# Patient Record
Sex: MALE | Race: WHITE | Employment: FULL TIME | URBAN - METROPOLITAN AREA
[De-identification: names, ages, dates, MRNs, and addresses within clinical notes are randomized per-mention and may not be internally consistent; named-entity substitution may affect disease eponyms.]

---

## 2020-10-31 ENCOUNTER — HOSPITAL ENCOUNTER (EMERGENCY)
Age: 30
Discharge: PSYCHIATRIC OR SUBSTANCE USE FACILITY | End: 2020-11-01
Attending: EMERGENCY MEDICINE
Payer: MEDICAID

## 2020-10-31 VITALS
TEMPERATURE: 98.1 F | SYSTOLIC BLOOD PRESSURE: 127 MMHG | OXYGEN SATURATION: 95 % | RESPIRATION RATE: 18 BRPM | DIASTOLIC BLOOD PRESSURE: 85 MMHG | HEART RATE: 85 BPM

## 2020-10-31 PROBLEM — F32.A DEPRESSION WITH SUICIDAL IDEATION: Status: ACTIVE | Noted: 2020-10-31

## 2020-10-31 PROBLEM — R45.851 DEPRESSION WITH SUICIDAL IDEATION: Status: ACTIVE | Noted: 2020-10-31

## 2020-10-31 LAB
SARS-COV-2, RAPID: NOT DETECTED
SARS-COV-2: NORMAL
SARS-COV-2: NORMAL
SOURCE: NORMAL

## 2020-10-31 PROCEDURE — 6360000002 HC RX W HCPCS: Performed by: STUDENT IN AN ORGANIZED HEALTH CARE EDUCATION/TRAINING PROGRAM

## 2020-10-31 PROCEDURE — U0002 COVID-19 LAB TEST NON-CDC: HCPCS

## 2020-10-31 PROCEDURE — 6370000000 HC RX 637 (ALT 250 FOR IP): Performed by: STUDENT IN AN ORGANIZED HEALTH CARE EDUCATION/TRAINING PROGRAM

## 2020-10-31 PROCEDURE — 90715 TDAP VACCINE 7 YRS/> IM: CPT | Performed by: STUDENT IN AN ORGANIZED HEALTH CARE EDUCATION/TRAINING PROGRAM

## 2020-10-31 PROCEDURE — 93005 ELECTROCARDIOGRAM TRACING: CPT | Performed by: STUDENT IN AN ORGANIZED HEALTH CARE EDUCATION/TRAINING PROGRAM

## 2020-10-31 PROCEDURE — 99285 EMERGENCY DEPT VISIT HI MDM: CPT

## 2020-10-31 PROCEDURE — 90471 IMMUNIZATION ADMIN: CPT | Performed by: STUDENT IN AN ORGANIZED HEALTH CARE EDUCATION/TRAINING PROGRAM

## 2020-10-31 RX ORDER — LORAZEPAM 0.5 MG/1
0.5 TABLET ORAL ONCE
Status: COMPLETED | OUTPATIENT
Start: 2020-10-31 | End: 2020-10-31

## 2020-10-31 RX ADMIN — TETANUS TOXOID, REDUCED DIPHTHERIA TOXOID AND ACELLULAR PERTUSSIS VACCINE, ADSORBED 0.5 ML: 5; 2.5; 8; 8; 2.5 SUSPENSION INTRAMUSCULAR at 19:51

## 2020-10-31 RX ADMIN — LORAZEPAM 0.5 MG: 0.5 TABLET ORAL at 19:15

## 2020-10-31 NOTE — ED NOTES
Pt to ER c/o suicidal ideation and self harm PTA, using glass to cut arms superficially. Pt arrives anxious, shaky, tearful. Pt states he was cheated on by his sig other and she took the kids away from him. PT arrives aox4 w/ rr loyd ramond unlabored, denies medical complaints. Pt has hx of self harm and psych dx but hasnt been on meds for years. Awaiting orders. Security @ bedside. Will continue to monitor.      Jayson Hazel RN  10/31/20 [id number]

## 2020-10-31 NOTE — ED PROVIDER NOTES
[address]  Emergency Department Encounter  Emergency Medicine Resident         This patient was evaluated in the Emergency Department for symptoms described in the history of present illness. He/she was evaluated in the context of the global COVID-19 pandemic, which necessitated consideration that the patient might be at risk for infection with the SARS-CoV-2 virus that causes COVID-19. Institutional protocols and algorithms that pertain to the evaluation of patients at risk for COVID-19 are in a state of rapid change based on information released by regulatory bodies including the CDC and federal and state organizations. These policies and algorithms were followed during the patient's care in the ED. Pt Name: Willa Trinidad  MRN: [id number]  [place] 1990  Date of evaluation: 10/31/20  PCP:  [address] [place] [address]       Chief Complaint   Patient presents with    Suicidal       HISTORY OF PRESENT ILLNESS  (Location/Symptom, Timing/Onset, Context/Setting, Quality, Duration, Modifying Factors, Severity.)    Willa Trinidad is a 26 y.o. male who presents with SI. Patient has longstanding psychiatric history of bipolar disorder and prior suicide attempt. States that he found out that his wife took their children and was sleeping with another man and she is now staying at [facility] with the kids. This upset him and he was feeling very anxious so he broke off a piece of glass and tried to cut his left wrist.  Additionally he burned himself several times over the chest with cigarettes. Admits to using marijuana but denies any ingestion or other drugs. States that he has been feeling anxious for the past several days. PAST MEDICAL / SURGICAL / SOCIAL / FAMILY HISTORY    has a past medical history of Ear infection. has a past surgical history that includes Appendectomy and [place] tooth extraction.     Social History     Socioeconomic History    Marital status: Legally      Spouse name: Not on file    Number of children: Not on file    Years of education: Not on file    Highest education level: Not on file   Occupational History    Not on file   Social Needs    Financial resource strain: Not on file    Food insecurity     Worry: Not on file     Inability: Not on file    Transportation needs     Medical: Not on file     Non-medical: Not on file   Tobacco Use    Smoking status: Current Every Day Smoker     Packs/day: 1.00     Types: Cigarettes   Substance and Sexual Activity    Alcohol use: Yes     Comment: socially    Drug use: No    Sexual activity: Not on file   Lifestyle    Physical activity     Days per week: Not on file     Minutes per session: Not on file    Stress: Not on file   Relationships    Social connections     Talks on phone: Not on file     Gets together: Not on file     Attends [employer] service: Not on file     Active member of club or organization: Not on file     Attends meetings of clubs or organizations: Not on file     Relationship status: Not on file    Intimate partner violence     Fear of current or ex partner: Not on file     Emotionally abused: Not on file     Physically abused: Not on file     Forced sexual activity: Not on file   Other Topics Concern    Not on file   Social History Narrative    Not on file       Family History   Family history unknown: Yes       Allergies:    Patient has no known allergies. Home Medications:  Prior to Admission medications    Medication Sig Start Date End Date Taking? Authorizing Provider   ibuprofen (ADVIL;MOTRIN) 600 MG tablet Take 600 mg by mouth every 6 hours as needed for Pain. Historical Provider, MD   HYDROcodone-acetaminophen (NORCO) 5-325 MG per tablet Take 1 tablet by mouth every 6 hours as needed for Pain.  7/4/14   Manuela Kauffman MD       REVIEW OF SYSTEMS    (2-9 systems for level 4, 10 or more for level 5)    Negative unless specified in HPI  Gen: No Fever, No tachycardia. Reassess. Transfer to [facility] when medically stable. EMERGENCY DEPARTMENT COURSE:         DIAGNOSTIC RESULTS / EMERGENCYDEPARTMENT COURSE   LABS:  Labs Reviewed   [id number]       RADIOLOGY:  No results found. CONSULTS:  IP CONSULT TO SOCIAL WORK    CRITICAL CARE:  Please see attending note    FINAL IMPRESSION     1. Suicidal ideation    2. Suicidal behavior with attempted self-injury ([facility] [address]        DISPOSITION / PLAN   DISPOSITION      PATIENT REFERRED TO:  No follow-up provider specified. DISCHARGE MEDICATIONS:  New Prescriptions    No medications on file       Dr. Ciro Ackerman.  1970 [facility]  Emergency Medicine Resident Physician, PGY-3    (Please note that portions of this note were completed with a voice recognition program.  Efforts were made to edit the dictations but occasionally words are mis-transcribed.)         Rossi Lester DO  Resident  10/31/20 1920

## 2020-10-31 NOTE — ED NOTES
Provisional Diagnosis:    Depression with suicidal ideations. Psychosocial and Contextual Factors:     Significant other left him for someone else and took the kids yesterday. C-SSRS Summary:    Patient: X  Family:  Agency:    Present Suicidal Behavior:    Verbal: X    Attempt: X-superficial cuts to left wrist, burns to chest.    Past Suicidal Behavior:    Verbal: X    Attempt: states that he hs tried to kill himself \"everyway possible. \"     Self-Injurious/Self-Mutilation: burning      Protective Factors:    States that he has stable and secure housing. Risk Factors:    Relationship stressors, no income, no insurance. States that he has a history of bipolar disorder and is not currently on medications or in treatment. Clinical Summary:    Patient is a 26year old,  male that presents to the ED with suicidal ideations, superficial lacerations to his left wrist and reports that he has a noose hanging in his basement \"waiting for him. \"  Patient states that he has tried to kill himself multiple times, \"every way possible,\" starting at age 13. He reports that the triggering event was his significant other left him for another person and took their kids. Patient is tearful and states that he is afraid he will kill himself if he goes home. He reports a history of bipolar disorder and is not taking medications currently or involved in mental health treatment. He reports historically, medications have not help and have increased his suicidal thoughts. He reports hearing noises and voices at times and states he has seen things in the past, it is unclear if these are hallucinations and will need further assessment. Patient denies using substances other than marijuana. He reports that he does not have an income or insurance, social issues are a stressor and his support system is limited. He denies current involvement with the legal system.      Level of Care Disposition:    Writer will consult with on call provider for admission to the [facility].       SHELLY Day  10/31/20 1936

## 2020-10-31 NOTE — ED PROVIDER NOTES
[facility]     Emergency Department     Faculty Attestation    I performed a history and physical examination of the patient and discussed management with the resident. I have reviewed and agree with the residents findings including all diagnostic interpretations, and treatment plans as written at the time of my review. Any areas of disagreement are noted on the chart. I was personally present for the key portions of any procedures. I have documented in the chart those procedures where I was not present during the key portions. For Physician Assistant/ Nurse Practitioner cases/documentation I have personally evaluated this patient and have completed at least one if not all key elements of the E/M (history, physical exam, and MDM). Additional findings are as noted. This patient was evaluated in the Emergency Department for symptoms described in the history of present illness. The patient was evaluated in the context of the global COVID-19 pandemic, which necessitated consideration that the patient might be at risk for infection with the SARS-CoV-2 virus that causes COVID-19. Institutional protocols and algorithms that pertain to the evaluation of patients at risk for COVID-19 are in a state of rapid change based on information released by regulatory bodies including the CDC and federal and state organizations. These policies and algorithms were followed during the patient's care in the ED. Primary Care Physician: Gibson Lopez    History: This is a 26 y.o. male who presents to the Emergency Department with complaint of suicidal. The patient became suicidal after family issues. He attempted to cut his wrist with glass shard. Is unsure when his last tetanus immunization was given. Patient denies any alcohol or drug ingestion. He says he did smoke marijuana. Physical:   oral temperature is 98.1 °F (36.7 °C).  His blood pressure is 127/85 and his

## 2020-10-31 NOTE — ED NOTES
Writer unable to reach [facility] Access at this time, left message for follow up.      SHELLY Jansen  10/31/20 1941

## 2020-10-31 NOTE — ED NOTES
[] Merle    [] [facility]    [x]  [facility] ASSESSMENT      Y  N     [x] [] In the past two weeks have you had thoughts of hurting yourself in any way? [x] [] In the past two weeks have you had thoughts that you would be better off dead? [] [x] Have you made a suicide attempt in the past two months? [x] [] Do you have a plan for hurting yourself or suicide? [] [x] Presence of hallucinations/voices related to hurting himself or herself or someone else. SUICIDE/SECURITY WATCH PRECAUTION CHECKLIST     Orders    [x]  Suicide/Security Watch Precautions initiated as checked below:   10/31/20 7:00 PM EDT BH31/BH31B    [x] Notified physician:  Jessenia Alvarez MD  10/31/20 7:00 PM EDT    [x] Orders obtained as appropriate:     [x] 1:1 Observer     [] Psych Consult     [] Psych Consult    Name:  Date:  Time:    [x] 1:1 Observer, Notified by:  Roopa Mcbride Nurse Supervisor    [x] Remove all personal clothes from room and place in snap/paper gown/pants. Slipper only    [x] Remove all personal belongings from room and secured away from patient. Documentation    [x] Initiate Suicide/Security Watch Precaution Flow Sheet    [x] Initiate individualized Care Plan/Problem    [x] Document why precautions initiated on flow sheet (Initiate Nursing Care Plan/Problem)    [x] 1:1 Observer in place; instructions provided. Suicide precautions require observer be within arms length. [x] Nurse-Observer Communication Hand-off initiated by RN, reviewed with Observer. Subsequently used as Hand Off between Observers. [x] Initiate every 15 minute observations per observer as delegated by the RN.     [x] Initiate RN assessment and documentation    Environmental Scan  Search Criteria and Process: OPTIONAL, see Search Policy    [] Reason for search:    [] Nursing in presence of second person to search patient    [x] Patient notified of reason for body assessment and belongings search:     Persons present during search:security   Results of search and disposition:       Searchers Name: security    These items or items similar should be removed from the room:   [] Chairs   [x] Telephone   [x] Trash cans and liners   [x] Plastic utensils (order Patient Safety tray)   [x] Empty or remove Sharps containers   [x] All personal clothing/belongings removed   [x] All unnecessary lead wires, electrical cords, draw cords, etc.   [x] Flowers and plants   [x] Double check for lighters, matches, razors, any glass items etc that can be used as weapons. Person completing Checklist: Gwendolyn Clarke       GENERAL INFORMATION     Y  N     [x] [] Has the patient been informed that they are on a watch and what that means? [x] [] Can the patient get out of Bed without nursing assistance? [x] [] Can the patient use the restroom without nursing assistance? [] [x] Can the patient walk the halls to [place] their legs? \"   [] [x] Does the patient have metal utensils? [x] [] Have the patient's belongings been placed out of control of the patient? [x] [] Have the patient and his/her belongings been checked for contraband? [x] [] Is the patient under any visitor restrictions? If Yes, explain:  [email or web address]   [] [x] Is the patient under an alias? [facility] Name:   Authorized visitors (no more than two are to be on the list)   Name/Relationship:   Name/Relationship:    Name of Staff member that you  Received this information from?: writer    General Description:    Cintia Mendoza BH31/BH31B male 26 y.o.  Admission weight:      Race: [x]  [] Black  []   []   [] [place] [] Other  Facial Hair:  [x] Yes  [] No  If yes, please describe:facial scruff  Identifying Marks (i.e. Visible tattoos, scars, etc...): tattoos to arms  NURSING CARE PLAN    Nursing Diagnosis: Risk of Self Directed Harm  [x] Actual  [x] Potential  Date Started: 10/31/20      Etiological Factors: (related to)  [x] Expressed or implied suicidal ideation/behavior  [x] Depression  [] Suicide attempt      [x] Low self-esteem  [] Hallucinations      [x] Feeling of Hopelessness  [] Substance abuse or withdrawal    [x] Dysfunctional family  [x] Major traumatic event, eg., divorce, etc   [x] Excessive stress/anxiety    10/31/20    Expected Outcomes    Patient will:   [x] Patient will remain safe for the duration of their stay   [x] Patient's environment will be safe, eg. Free of potential suicide weapons   [] Verbalize Recovery from suicidal episode and improvement in self-worth   [x] Discuss feeling that precipitated suicide attempt/thoughts/behavior   [] Will describe available resources for crisis prevention and management   [] Will verbalize positive coping skills     Nursing Intervention   [x] Assessment and Observations hourly   [x] Suicide Precautions implemented with patient, should be 1:1 observation   [x] Document observation [id number] and RN assessment hourly   [] Consult physician for:    [] Psychiatric consult    [] Pharmacological therapy    [] Other:    [x] Patient search completed by security   [x] Initiated appropriate safety protocols by removing from the patient's environment anything that could be used to inflict self injury, eg. Order safe tray, snap gown, etc   [x] Maintain open, warm, caring, non-judgmental attitude/manner towards patient   [] Discuss advantages and disadvantages of existing coping methods/skills   [x] Assist and educate patient with identifying present strengths and coping skills   [x] Keep patient informed regarding plan of care and provide clear concise explanations. Provide the patient/family education information as well as telephone numbers and other information about crisis centers, hot lines, and counselors.     Discharge Planning:   [] Referral  [] Groups [] Health agencies  [] Other:          Mary York RN  10/31/20 1871

## 2020-11-01 ENCOUNTER — HOSPITAL ENCOUNTER (INPATIENT)
Age: 30
LOS: 5 days | Discharge: HOME | DRG: 753 | End: 2020-11-06
Attending: PSYCHIATRY & NEUROLOGY | Admitting: PSYCHIATRY & NEUROLOGY
Payer: MEDICAID

## 2020-11-01 PROBLEM — F31.30 BIPOLAR AFFECTIVE DISORDER, CURRENT EPISODE DEPRESSED (HCC): Status: ACTIVE | Noted: 2020-10-31

## 2020-11-01 PROCEDURE — 99223 1ST HOSP IP/OBS HIGH 75: CPT | Performed by: PSYCHIATRY & NEUROLOGY

## 2020-11-01 PROCEDURE — 1240000000 HC EMOTIONAL WELLNESS R&B

## 2020-11-01 PROCEDURE — 6370000000 HC RX 637 (ALT 250 FOR IP): Performed by: PSYCHIATRY & NEUROLOGY

## 2020-11-01 RX ORDER — IBUPROFEN 400 MG/1
400 TABLET ORAL EVERY 6 HOURS PRN
Status: DISCONTINUED | OUTPATIENT
Start: 2020-11-01 | End: 2020-11-06 | Stop reason: HOSPADM

## 2020-11-01 RX ORDER — POLYETHYLENE GLYCOL 3350 17 G/17G
17 POWDER, FOR SOLUTION ORAL DAILY PRN
Status: DISCONTINUED | OUTPATIENT
Start: 2020-11-01 | End: 2020-11-06 | Stop reason: HOSPADM

## 2020-11-01 RX ORDER — TRAZODONE HYDROCHLORIDE 50 MG/1
50 TABLET ORAL NIGHTLY PRN
Status: DISCONTINUED | OUTPATIENT
Start: 2020-11-01 | End: 2020-11-06 | Stop reason: HOSPADM

## 2020-11-01 RX ORDER — MAGNESIUM HYDROXIDE/ALUMINUM HYDROXICE/SIMETHICONE 120; 1200; 1200 MG/30ML; MG/30ML; MG/30ML
30 SUSPENSION ORAL EVERY 6 HOURS PRN
Status: DISCONTINUED | OUTPATIENT
Start: 2020-11-01 | End: 2020-11-06 | Stop reason: HOSPADM

## 2020-11-01 RX ORDER — ACETAMINOPHEN 325 MG/1
650 TABLET ORAL EVERY 4 HOURS PRN
Status: DISCONTINUED | OUTPATIENT
Start: 2020-11-01 | End: 2020-11-06 | Stop reason: HOSPADM

## 2020-11-01 RX ORDER — HYDROXYZINE 50 MG/1
50 TABLET, FILM COATED ORAL 3 TIMES DAILY PRN
Status: DISCONTINUED | OUTPATIENT
Start: 2020-11-01 | End: 2020-11-06 | Stop reason: HOSPADM

## 2020-11-01 RX ORDER — TRAZODONE HYDROCHLORIDE 50 MG/1
50 TABLET ORAL NIGHTLY PRN
Status: DISCONTINUED | OUTPATIENT
Start: 2020-11-01 | End: 2020-11-01

## 2020-11-01 RX ADMIN — HYDROXYZINE HYDROCHLORIDE 50 MG: 50 TABLET, FILM COATED ORAL at 01:14

## 2020-11-01 RX ADMIN — HYDROXYZINE HYDROCHLORIDE 50 MG: 50 TABLET, FILM COATED ORAL at 20:02

## 2020-11-01 RX ADMIN — TRAZODONE HYDROCHLORIDE 50 MG: 50 TABLET ORAL at 20:02

## 2020-11-01 RX ADMIN — HYDROXYZINE HYDROCHLORIDE 50 MG: 50 TABLET, FILM COATED ORAL at 12:47

## 2020-11-01 RX ADMIN — TRAZODONE HYDROCHLORIDE 50 MG: 50 TABLET ORAL at 01:14

## 2020-11-01 ASSESSMENT — PAIN SCALES - GENERAL
PAINLEVEL_OUTOF10: 0
PAINLEVEL_OUTOF10: 0

## 2020-11-01 ASSESSMENT — SLEEP AND FATIGUE QUESTIONNAIRES
DIFFICULTY STAYING ASLEEP: YES
DIFFICULTY FALLING ASLEEP: YES
DIFFICULTY ARISING: YES
DO YOU USE A SLEEP AID: NO
AVERAGE NUMBER OF SLEEP HOURS: 4
DO YOU HAVE DIFFICULTY SLEEPING: YES
RESTFUL SLEEP: YES
SLEEP PATTERN: DIFFICULTY FALLING ASLEEP;DIFFICULTY ARISING;DISTURBED/INTERRUPTED SLEEP;INSOMNIA

## 2020-11-01 ASSESSMENT — LIFESTYLE VARIABLES
HISTORY_ALCOHOL_USE: NO
HISTORY_ALCOHOL_USE: NO

## 2020-11-01 ASSESSMENT — PATIENT HEALTH QUESTIONNAIRE - PHQ9: SUM OF ALL RESPONSES TO PHQ QUESTIONS 1-9: 22

## 2020-11-01 NOTE — PROGRESS NOTES
`[facility]  Admission Note     Admission Type:   Admission Type: Voluntary    Reason for admission:  Reason for Admission: suicide attempt by cutting wrist    PATIENT STRENGTHS:  Strengths: Positive Support, Communication, No significant Physical Illness    Patient Strengths and Limitations:  Limitations: Tendency to isolate self, General negative or hopeless attitude about future/recovery    Addictive Behavior:   Addictive Behavior  In the past 3 months, have you felt or has someone told you that you have a problem with:  : None  Do you have a history of Chemical Use?: No  Do you have a history of Alcohol Use?: No  Do you have a history of Street Drug Abuse?: No  Histroy of Prescripton Drug Abuse?: No    Medical Problems:   Past Medical History:   Diagnosis Date    Ear infection     multiple over since 2011       Status EXAM:  Status and Exam  Normal: No  Facial Expression: Avoids Gaze, Sad  Affect: Blunt  Level of Consciousness: Alert  Mood:Normal: No  Mood: Depressed, Anxious, Helpless, Sad, Despair  Motor Activity:Normal: Yes  Interview Behavior: Cooperative  Preception: [place] to Person, [place] to Time, [place] to Place, [place] to Situation  Attention:Normal: No  Attention: Distractible, Unable to Concentrate  Thought Processes: Blocking  Thought Content:Normal: No  Thought Content: Preoccupations  Hallucinations: None  Delusions: No  Memory:Normal: No  Memory: Poor Remote  Insight and Judgment: No  Insight and Judgment: Poor Judgment  Present Suicidal Ideation: Yes  Present Homicidal Ideation: No    Tobacco Screening:  Practical Counseling, on admission, gregg X, if applicable and completed (first 3 are required if patient doesn't refuse):            ( )  Recognizing danger situations (included triggers and roadblocks)                    ( )  Coping skills (new ways to manage stress, exercise, relaxation techniques, changing routine, distraction) ( )  Basic information about quitting (benefits of quitting, techniques in how to quit, available resources  ( ) Referral for counseling faxed to [facility]                                           ( ) Patient refused counseling  ( x) Patient has not smoked in the last 30 days    Metabolic Screening:    No results found for: LABA1C    No results found for: CHOL  No results found for: TRIG  No results found for: HDL  No components found for: LDLCAL  No results found for: LABVLDL      Body mass index is 22.96 kg/m². BP Readings from Last 2 Encounters:   11/01/20 132/83   10/31/20 127/85           Pt admitted with followings belongings:  Dentures: None  Vision - Corrective Lenses: None  Hearing Aid: None  Jewelry: Other (Comment)(toungue piercing)  Body Piercings Removed: No  Clothing: Footwear, Jacket / coat, Pants, Shirt  Were All Patient Medications Collected?: Not Applicable  Other Valuables: Cell phone, Money (Comment), Wallet, Other (Comment)( license, VISA [phone])     Valuables placed in safe in security envelope, number:  [id number]  . Patient's home medications were no home medications. Patient oriented to surroundings and program expectations and copy of patient rights given. Received admission packet. Consents reviewed, signed. Patient verbalize understanding. Patient education on precautions     Patient to Ed after suicide attempt by cutting left wrist with piece of broken glass due to recent break-up with his significant other. He has a superficial cut to his wrist. He states the glass wasn't sharp enough. Patien also has several cigarette burns to his chest. He states those were self-inflicted in an effort to relieve his emotional pain. He is cooperative and tearful. He reports current suicidal ideation with no plan. Agrees to approach staff if these feelings increase. He is on no medications, is not linked.  He has had several previous attempts over his lifetime.                   Negro Olsen RN
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
## 2020-11-01 NOTE — PLAN OF CARE
[facility]  Initial Interdisciplinary Treatment Plan NO      Original treatment plan Date & Time: 11/1/2020   0835    Admission Type:  Admission Type: Voluntary    Reason for admission:   Reason for Admission: suicide attempt by cutting wrist    Estimated Length of Stay:  5-7days  Estimated Discharge Date: to be determined by physician    PATIENT STRENGTHS:  Patient Strengths:Strengths: Positive Support, Communication, No significant Physical Illness  Patient Strengths and Limitations:Limitations: Tendency to isolate self, General negative or hopeless attitude about future/recovery  Addictive Behavior: Addictive Behavior  In the past 3 months, have you felt or has someone told you that you have a problem with:  : None  Do you have a history of Chemical Use?: No  Do you have a history of Alcohol Use?: No  Do you have a history of Street Drug Abuse?: No  Histroy of Prescripton Drug Abuse?: No  Medical Problems:  Past Medical History:   Diagnosis Date    Ear infection     multiple over since 2011     Status EXAM:Status and Exam  Normal: No  Facial Expression: Avoids Gaze, Sad  Affect: Blunt  Level of Consciousness: Alert  Mood:Normal: No  Mood: Depressed, Anxious, Helpless, Sad, Despair  Motor Activity:Normal: Yes  Interview Behavior: Cooperative  Preception: Nixon to Person, Lemon Domingo to Time, Nixon to Place, Nixon to Situation  Attention:Normal: No  Attention: Distractible, Unable to Concentrate  Thought Processes: Blocking  Thought Content:Normal: No  Thought Content: Preoccupations  Hallucinations: None  Delusions: No  Memory:Normal: No  Memory: Poor Remote  Insight and Judgment: No  Insight and Judgment: Poor Judgment  Present Suicidal Ideation: Yes  Present Homicidal Ideation: No    EDUCATION:   Learner Progress Toward Treatment Goals: reviewed group plans and strategies for care    Method:group therapy, medication compliance, individualized assessments and care planning    Outcome: needs reinforcement    PATIENT GOALS: to be discussed with patient within 72 hours    PLAN/TREATMENT RECOMMENDATIONS:     continue group therapy , medications compliance, goal setting, individualized assessments and care, continue to monitor pt on unit      SHORT-TERM GOALS:   Time frame for Short-Term Goals: 5-7 days    LONG-TERM GOALS:  Time frame for Long-Term Goals: 6 months  Members Present in Team Meeting: See [address]

## 2020-11-01 NOTE — BH NOTE
Group Therapy Note     Date: 11/1/2020     Group Start Time: 1600  Group End Time: 1700  Group Topic: Wellness Group      COLIN Melchor, RN       Group Therapy Note     Attendees: 4/22     Pt declined to attend Wellness Group at 1600 despite encouragement. Pt offered 1:1 and refused.

## 2020-11-01 NOTE — ED NOTES
Reviewed pt case with on call psychiatrist who finds that pt meets criteria for inpatient psychiatric admission. Pt to be admitted to the [facility] under the care of Dr. Aron Mariee with a diagnosis of Depression with suicidal ideations. REILLY faxed.       SHELLY Retana  10/31/20 2006

## 2020-11-01 NOTE — BH NOTE
Patient given tobacco quitline number 86509425523 at this time, refusing to call at this time, states \" I just dont want to quit now\"- patient given information as to the dangers of long term tobacco use. Continue to reinforce the importance of tobacco cessation.

## 2020-11-01 NOTE — BH NOTE
Patient is anxious as evidenced by increased internal stimuli, pacing, and stating \"I need something for anxiety\". Patient refused one to one talk time, shower, and decreased stimuli. Patient accepting of PRN. Will continue to monitor.
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
## 2020-11-01 NOTE — BH NOTE

## 2020-11-01 NOTE — GROUP NOTE
Group Therapy Note    Date: 11/1/2020    Group Start Time: 1330  Group End Time: 1435  Group Topic: Music Therapy    [facility]    Minus Rater        Group Therapy Note    Pt did not attend music therapy group d/t resting in room despite staff invitation to attend. 1:1 talk time offered as alternative to group session, pt declined.

## 2020-11-01 NOTE — BH NOTE
Provider notified of suicide BPA and pt's current mental status. Provider declined 1:1 and ordered 15 minute monitoring.

## 2020-11-01 NOTE — GROUP NOTE
Group Therapy Note    Date: 11/1/2020    Group Start Time: 1000  Group End Time: 5423  Group Topic: Psychoeducation    [address], MSW, LSW        Group Therapy Note    Attendees: 4    Pt declined to attend psychotherapy at 1000 am despite encouragement. Pt offered 1:1 and refused.

## 2020-11-01 NOTE — ED NOTES
Frandy Leblanc who advised slight delay in transfer time.  ETA by midnight     SHELLY Guevara  10/31/20 4581

## 2020-11-01 NOTE — GROUP NOTE
Group Therapy Note    Date: 11/1/2020    Group Start Time: 0900  Group End Time: 0920  Group Topic: Community Meeting    LILIAN Sauceda        Group Therapy Note    Pt did not attend community meeting group d/t resting in room despite staff invitation to attend. 1:1 talk time offered as alternative to group session, pt declined.

## 2020-11-01 NOTE — PLAN OF CARE
Problem: Altered Mood, Depressive Behavior:  Goal: Able to verbalize and/or display a decrease in depressive symptoms  Description: Able to verbalize and/or display a decrease in depressive symptoms  Outcome: Ongoing  Patient reports increased depression and anxiety. He is isolative to room and flat. Patient states he just started to eat more last night after not eating for days. He had trouble with sleep but states the meds helped. Patient is alert and oriented x 4. He is calm/cooperative. Problem: Altered Mood, Depressive Behavior:  Goal: Absence of self-harm  Description: Absence of self-harm  Outcome: Ongoing  Patient denies thoughts of self harm at this time. Patient agrees to seek staff if thoughts were to occur. Problem: Suicide risk  Goal: Provide patient with safe environment  Description: Provide patient with safe environment  Outcome: Ongoing  15 min safety checks in places. Patient safety maintained.

## 2020-11-02 LAB
EKG ATRIAL RATE: 91 BPM
EKG P AXIS: 74 DEGREES
EKG P-R INTERVAL: 176 MS
EKG Q-T INTERVAL: 346 MS
EKG QRS DURATION: 82 MS
EKG QTC CALCULATION (BAZETT): 425 MS
EKG R AXIS: 82 DEGREES
EKG T AXIS: 55 DEGREES
EKG VENTRICULAR RATE: 91 BPM

## 2020-11-02 PROCEDURE — 1240000000 HC EMOTIONAL WELLNESS R&B

## 2020-11-02 PROCEDURE — 93010 ELECTROCARDIOGRAM REPORT: CPT | Performed by: INTERNAL MEDICINE

## 2020-11-02 PROCEDURE — 6370000000 HC RX 637 (ALT 250 FOR IP): Performed by: PSYCHIATRY & NEUROLOGY

## 2020-11-02 PROCEDURE — 99232 SBSQ HOSP IP/OBS MODERATE 35: CPT | Performed by: PSYCHIATRY & NEUROLOGY

## 2020-11-02 RX ORDER — QUETIAPINE FUMARATE 25 MG/1
25 TABLET, FILM COATED ORAL ONCE
Status: DISCONTINUED | OUTPATIENT
Start: 2020-11-02 | End: 2020-11-03

## 2020-11-02 RX ORDER — QUETIAPINE FUMARATE 50 MG/1
50 TABLET, FILM COATED ORAL 2 TIMES DAILY
Status: DISCONTINUED | OUTPATIENT
Start: 2020-11-02 | End: 2020-11-03

## 2020-11-02 RX ADMIN — TRAZODONE HYDROCHLORIDE 50 MG: 50 TABLET ORAL at 21:28

## 2020-11-02 RX ADMIN — QUETIAPINE FUMARATE 50 MG: 50 TABLET ORAL at 21:28

## 2020-11-02 RX ADMIN — HYDROXYZINE HYDROCHLORIDE 50 MG: 50 TABLET, FILM COATED ORAL at 21:28

## 2020-11-02 NOTE — GROUP NOTE
Group Therapy Note    Date: 11/2/2020    Group Start Time: 1100  Group End Time: 9260  Group Topic: Psychotherapy    DIANA Bhakta        Group Therapy Note    Attendees: 6/11      Pt denied 1:1. Despite staff encouragement, pt denied 11:00am psychotherapy group.               Signature:  DIANA Chaparro

## 2020-11-02 NOTE — PLAN OF CARE
Problem: Altered Mood, Depressive Behavior:  Goal: Able to verbalize and/or display a decrease in depressive symptoms  Description: Able to verbalize and/or display a decrease in depressive symptoms  11/1/2020 2126 by Turner Rodriguez RN  Outcome: Ongoing  Patient stated that he was feeling depressed during this shift. He was tearful when talking with staff and was angry with his wife for leaving with the kids to go to another man's house. Patient stated his sleep is not adequate and he thinks of ways to hurt himself all night. Patient isolative to self but friendly with staff and peers. Problem: Altered Mood, Depressive Behavior:  Goal: Absence of self-harm  Description: Absence of self-harm  11/1/2020 2126 by Turner Rodriguez RN  Outcome: Ongoing  Patient was absent of self-harm during this shift. Patient recently tried to cut his wrists with a broken piece of glass. Patient states he has ways that he wants to hurt himself (bashing head into glass and using glass to cut self) but he states that he loves his kids too much and that is why he is at the [facility] to receive help. Problem: Suicide risk  Goal: Provide patient with safe environment  Description: Provide patient with safe environment  11/1/2020 2126 by Turner Rodriguez RN  Outcome: Ongoing  Patient was provided with a safe environment. Patient is being rounded on every 15 minutes for safety and environmental checks. Patient given many opportunities for 1:1 time with staff and patient contracted for safety and stated he would approach staff if he had thoughts of harming himself.

## 2020-11-02 NOTE — PLAN OF CARE
Patient called back wondering why Dr. Funes denied filling medication.  Patient informed that Dr. Funes wanted him to follow up in 3 months and the appointment was canceled.  Patient rescheduled the appointment with Dr. Funes. Please refill rizatriptan (MAXALT) 10 MG tablet at [facility].   Problem: Altered Mood, Depressive Behavior:  Goal: Able to verbalize and/or display a decrease in depressive symptoms  Description: Able to verbalize and/or display a decrease in depressive symptoms  11/2/2020 1615 by Lluvia Call RN  Outcome: Ongoing   1:1 with pt x ten minutes. Pt encouraged to attend unit programming and interact with peers and staff. Pt also encouraged to tend to hygiene and ADLs. Pt encouraged to discuss feelings with staff and feedback and reassurance provided. Pt admits to having thoughts of self harm. Agreeable to seeking out staff should feelings increase. Able to identify positive coping skills and plan for safety. Will cont to monitor q15 minutes for safety and provide support and reassurance as needed. Pt controlled in behaviors. Social with select peers. Thoughts are organized. Depressed mood with a flat affect.

## 2020-11-02 NOTE — PLAN OF CARE
Distractible  Thought Processes: Circumstantial  Thought Content:Normal: No  Thought Content: Preoccupations  Hallucinations: None  Delusions: No  Memory:Normal: No  Memory: Confabulation  Insight and Judgment: No  Insight and Judgment: Poor Judgment, Poor Insight  Present Suicidal Ideation: Yes(pt states he has a plan but does not want to die because of his kids)  Present Homicidal Ideation: No    Daily Assessment Last Entry:   Daily Sleep (WDL): Exceptions to WDL         Patient Currently in Pain: No  Daily Nutrition (WDL): Within Defined Limits    Patient Monitoring:  Frequency of Checks: 4 times per hour, close    Psychiatric Symptoms:   Depression Symptoms  Depression Symptoms: Change in energy level, Crying, Feelings of worthlessness, Feelings of hopelessess, Feelings of helplessness, Loss of interest, Sleep disturbance  Anxiety Symptoms  Anxiety Symptoms: Generalized  Diane Symptoms  Diane Symptoms: No problems reported or observed. Psychosis Symptoms  Delusion Type: No problems reported or observed. Suicide Risk CSSR-S:  1) Within the past month, have you wished you were dead or wished you could go to sleep and not wake up? : Yes  2) Have you actually had any thoughts of killing yourself? : Yes  3) Have you been thinking about how you might kill yourself? : Yes  5) Have you started to work out or worked out the details of how to kill yourself?  Do you intend to carry out this plan? : Yes  6) Have you ever done anything, started to do anything, or prepared to do anything to end your life?: Yes  Change in Result: No Change in Plan of care: No      EDUCATION:   EDUCATION:   Learner Progress Toward Treatment Goals: Reviewed results and recommendations of this team, Reviewed group plan and strategies, Reviewed signs, symptoms and risk of self harm and violent behavior, Reviewed goals and plan of care    Method:small group, individual verbal education    Outcome:verbalized by patient, but needs reinforcement

## 2020-11-02 NOTE — PROGRESS NOTES
BEHAVIORAL HEALTH FOLLOW-UP NOTE     11/2/2020     Patient was seen and examined in person, Chart reviewed   Patient's case discussed with staff/team    Chief Complaint: Suicidal ideation    Interim History:     The patient with improvement in mood. He has slept better last night. He says \"my medardo has calmed down\". Patient continues to isolate himself in bed and shows poor self care.   The patient denying any auditory or visual hallucinations    /65   Pulse 65   Temp 97.4 °F (36.3 °C) (Oral)   Resp 14   Ht 6' 1\" (1.854 m)   Wt 174 lb (78.9 kg)   BMI 22.96 kg/m²   Appetite:   [x] Normal/Unchanged  [] Increased  [] Decreased      Sleep:       [] Normal/Unchanged  [x] Fair       [] Poor              Energy:    [] Normal/Unchanged  [] Increased  [x] Decreased        Aggression:  [] yes  [x] no    Patient is [x] able  [] unable to CONTRACT FOR SAFETY ON THE UNIT    PAST MEDICAL/PSYCHIATRIC HISTORY:   Past Medical History:   Diagnosis Date    Ear infection     multiple over since 2011       FAMILY/SOCIAL HISTORY:  Family History   Family history unknown: Yes     Social History     Socioeconomic History    Marital status: Legally      Spouse name: Not on file    Number of children: Not on file    Years of education: Not on file    Highest education level: Not on file   Occupational History    Not on file   Social Needs    Financial resource strain: Not on file    Food insecurity     Worry: Not on file     Inability: Not on file    Transportation needs     Medical: Not on file     Non-medical: Not on file   Tobacco Use    Smoking status: Current Every Day Smoker     Packs/day: 1.00     Types: Cigarettes   Substance and Sexual Activity    Alcohol use: Yes     Comment: socially    Drug use: No    Sexual activity: Not on file   Lifestyle    Physical activity     Days per week: Not on file     Minutes per session: Not on file    Stress: Not on file   Relationships    Social connections Talks on phone: Not on file     Gets together: Not on file     Attends Christian service: Not on file     Active member of club or organization: Not on file     Attends meetings of clubs or organizations: Not on file     Relationship status: Not on file    Intimate partner violence     Fear of current or ex partner: Not on file     Emotionally abused: Not on file     Physically abused: Not on file     Forced sexual activity: Not on file   Other Topics Concern    Not on file   Social History Narrative    Not on file           ROS:  [x] All negative/unchanged except if checked.  Explain positive(checked items) below:  [] Constitutional  [] Eyes  [] Ear/Nose/Mouth/Throat  [] Respiratory  [] CV  [] GI  []   [] Musculoskeletal  [] Skin/Breast  [] Neurological  [] Endocrine  [] Heme/Lymph  [] Allergic/Immunologic    Explanation:     MEDICATIONS:    Current Facility-Administered Medications:     QUEtiapine (SEROQUEL) tablet 50 mg, 50 mg, Oral, BID, aYreli Huff MD    QUEtiapine (SEROQUEL) tablet 25 mg, 25 mg, Oral, Once, Yareli Huff MD    acetaminophen (TYLENOL) tablet 650 mg, 650 mg, Oral, Q4H PRN, Earnest Garzon MD    aluminum & magnesium hydroxide-simethicone (MAALOX) 200-200-20 MG/5ML suspension 30 mL, 30 mL, Oral, Q6H PRN, Earnest Garzon MD    hydrOXYzine (ATARAX) tablet 50 mg, 50 mg, Oral, TID PRN, Earnest Garzon MD, 50 mg at 11/01/20 2002    ibuprofen (ADVIL;MOTRIN) tablet 400 mg, 400 mg, Oral, Q6H PRN, Earnest Garzon MD    polyethylene glycol (GLYCOLAX) packet 17 g, 17 g, Oral, Daily PRN, Earnest Garzon MD    nicotine polacrilex (NICORETTE) gum 2 mg, 2 mg, Oral, PRN, Earnest Garzon MD    traZODone (DESYREL) tablet 50 mg, 50 mg, Oral, Nightly PRN, Earnest Garzon MD, 50 mg at 11/01/20 2002      Examination:  /65   Pulse 65   Temp 97.4 °F (36.3 °C) (Oral)   Resp 14   Ht 6' 1\" (1.854 m)   Wt 174 lb (78.9 kg)   BMI 22.96 kg/m²   Gait - steady  Medication side effects(SE): none    Mental Status Examination:    Level of consciousness:  within normal limits   Appearance:  poor grooming and poor hygiene  Behavior/Motor:  psychomotor retardation  Attitude toward examiner:  cooperative  Speech:  slow and poverty of speech   Mood: anxious and constricted  Affect:  mood congruent  Thought processes:  poverty of thought   Thought content:  Homicidal ideation - none  Suicidal Ideation:  passive  Delusions:  no evidence of delusions  Perceptual Disturbance:  denies any perceptual disturbance  Cognition:  oriented to person, place, and time   Concentration intact  Insight fair   Judgement fair     ASSESSMENT:   Patient symptoms are:  [] Well controlled  [x] Improving  [] Worsening  [] No change      Diagnosis:  Principal Problem:    Bipolar affective disorder, current episode depressed ([facility] [address]  Resolved Problems:    * No resolved hospital problems. *      LABS:    No results for input(s): WBC, HGB, PLT in the last 72 hours. No results for input(s): NA, K, CL, CO2, BUN, CREATININE, GLUCOSE in the last 72 hours. No results for input(s): BILITOT, ALKPHOS, AST, ALT in the last 72 hours. No results found for: Alexandre Revels, LABBENZ, CANNAB, COCAINESCRN, LABMETH, [address], PHENCYCLIDINESCREENURINE, PPXUR, ETOH  No results found for: TSH, FREET4  No results found for: LITHIUM  No results found for: VALPROATE, CBMZ    RISK ASSESSMENT: Moderate risk of suicide. Low risk of harm to others    Treatment Plan:  Reviewed current Medications with the patient. Seroquel is ordered for the patient    Risks, benefits, side effects, drug-to-drug interactions and alternatives to treatment were discussed. The patient  consented to treatment. Encourage patient to attend group and other milieu activities.   Discharge planning discussed with the patient and treatment team.    PSYCHOTHERAPY/COUNSELING:  [] Therapeutic interview  [x] Supportive  [] CBT  [] Ongoing  [] Other    [x] Patient continues to need, on a daily basis, active treatment furnished directly by or requiring the supervision of inpatient psychiatric personnel      Anticipated Length of stay:2-3 days. Jose Ybarra is a 26 y.o. male being evaluated face to face.     --Dejuan Beard MD on 11/2/2020 at 4:19 PM    An electronic signature was used to authenticate this note. **This report has been created using voice recognition software. It may contain minor errors which are inherent in voice recognition technology. **

## 2020-11-02 NOTE — GROUP NOTE
Group Therapy Note    Date: 11/2/2020    Group Start Time: 1430  Group End Time: 4178  Group Topic: Recovery    [facility]    [place], [place]        Group Therapy Note    Attendees: 9/22         Patient's Goal:  To increase interpersonal interaction. Notes:  Pt attended and participated in group. Status After Intervention:  Improved    Participation Level:  Active Listener and Interactive    Participation Quality: Appropriate and Attentive      Speech:  normal      Thought Process/Content: Logical      Affective Functioning: Congruent      Mood: euthymic      Level of consciousness:  Alert and Attentive      Response to Learning: Progressing to goal      Endings: None Reported    Modes of Intervention: Socialization, Problem-solving, Activity, Media and Reality-testing      Discipline Responsible: Psychoeducational Specialist      Signature:  Koffi Wong

## 2020-11-03 PROCEDURE — 1240000000 HC EMOTIONAL WELLNESS R&B

## 2020-11-03 PROCEDURE — 6370000000 HC RX 637 (ALT 250 FOR IP): Performed by: NURSE PRACTITIONER

## 2020-11-03 PROCEDURE — 6370000000 HC RX 637 (ALT 250 FOR IP): Performed by: PSYCHIATRY & NEUROLOGY

## 2020-11-03 PROCEDURE — 99232 SBSQ HOSP IP/OBS MODERATE 35: CPT | Performed by: PSYCHIATRY & NEUROLOGY

## 2020-11-03 RX ORDER — QUETIAPINE FUMARATE 100 MG/1
100 TABLET, FILM COATED ORAL 2 TIMES DAILY
Status: DISCONTINUED | OUTPATIENT
Start: 2020-11-03 | End: 2020-11-05

## 2020-11-03 RX ADMIN — TRAZODONE HYDROCHLORIDE 50 MG: 50 TABLET ORAL at 21:38

## 2020-11-03 RX ADMIN — QUETIAPINE FUMARATE 50 MG: 50 TABLET ORAL at 08:47

## 2020-11-03 RX ADMIN — HYDROXYZINE HYDROCHLORIDE 50 MG: 50 TABLET, FILM COATED ORAL at 21:38

## 2020-11-03 RX ADMIN — HYDROXYZINE HYDROCHLORIDE 50 MG: 50 TABLET, FILM COATED ORAL at 18:27

## 2020-11-03 RX ADMIN — QUETIAPINE FUMARATE 100 MG: 100 TABLET ORAL at 21:38

## 2020-11-03 NOTE — GROUP NOTE
Group Therapy Note    Date: 11/3/2020    Group Start Time: 1330  Group End Time: 0997  Group Topic: Psychoeducation    [facility]IL[facility] Edgar, [address]    Attendees: 9         Patient's Goal:  To be able to define stress. To explore differences between external stressors and internal stressors. To be able to recognize physical symptoms associated with stress. Notes:  Patient attended group and actively participated in task at hand. Patient conversed appropriately with peers and Christa Henderson. Status After Intervention:  Improved    Participation Level:  Active Listener and Interactive    Participation Quality: Appropriate, Attentive and Sharing      Speech:  normal      Thought Process/Content: Logical      Affective Functioning: Congruent      Mood: euthymic      Level of consciousness:  Alert, Oriented x4 and Attentive      Response to Learning: Able to verbalize current knowledge/experience, Able to verbalize/acknowledge new learning, Able to retain information, Capable of insight and Progressing to goal      Endings: None Reported       Modes of Intervention: Education, Support, Socialization, Exploration, Clarifying, Problem-solving, Activity, Confrontation, Limit-setting and Reality-testing      Discipline Responsible: Psychoeducational Specialist      Signature:  Heath Hannah

## 2020-11-03 NOTE — PROGRESS NOTES
BEHAVIORAL HEALTH FOLLOW-UP NOTE     11/3/2020     Patient was seen and examined in person, Chart reviewed   Patient's case discussed with staff/team    Chief Complaint: Suicidal ideation    Interim History:     Interviewed patient in room. Per staff patient compliant with medication and attending groups. Patient reports a slight improvement in mood and anxiety, reports decreased intensity and suicidal ideation, contracts for safety while on unit. He reports that he slept \"really bad last night I just tossed and turned\". He endorses having racing thoughts.   He denies any medication side effects    /73   Pulse 90   Temp 97.5 °F (36.4 °C) (Oral)   Resp 14   Ht 6' 1\" (1.854 m)   Wt 174 lb (78.9 kg)   BMI 22.96 kg/m²   Appetite:   [x] Normal/Unchanged  [] Increased  [] Decreased      Sleep:       [] Normal/Unchanged  [x] Fair       [] Poor              Energy:    [] Normal/Unchanged  [] Increased  [x] Decreased        Aggression:  [] yes  [x] no    Patient is [x] able  [] unable to CONTRACT FOR SAFETY ON THE UNIT    PAST MEDICAL/PSYCHIATRIC HISTORY:   Past Medical History:   Diagnosis Date    Ear infection     multiple over since 2011       FAMILY/SOCIAL HISTORY:  Family History   Family history unknown: Yes     Social History     Socioeconomic History    Marital status: Legally      Spouse name: Not on file    Number of children: Not on file    Years of education: Not on file    Highest education level: Not on file   Occupational History    Not on file   Social Needs    Financial resource strain: Not on file    Food insecurity     Worry: Not on file     Inability: Not on file    Transportation needs     Medical: Not on file     Non-medical: Not on file   Tobacco Use    Smoking status: Current Every Day Smoker     Packs/day: 1.00     Types: Cigarettes   Substance and Sexual Activity    Alcohol use: Yes     Comment: socially    Drug use: No    Sexual activity: Not on file   Lifestyle  Physical activity     Days per week: Not on file     Minutes per session: Not on file    Stress: Not on file   Relationships    Social connections     Talks on phone: Not on file     Gets together: Not on file     Attends Restoration service: Not on file     Active member of club or organization: Not on file     Attends meetings of clubs or organizations: Not on file     Relationship status: Not on file    Intimate partner violence     Fear of current or ex partner: Not on file     Emotionally abused: Not on file     Physically abused: Not on file     Forced sexual activity: Not on file   Other Topics Concern    Not on file   Social History Narrative    Not on file           ROS:  [x] All negative/unchanged except if checked.  Explain positive(checked items) below:  [] Constitutional  [] Eyes  [] Ear/Nose/Mouth/Throat  [] Respiratory  [] CV  [] GI  []   [] Musculoskeletal  [] Skin/Breast  [] Neurological  [] Endocrine  [] Heme/Lymph  [] Allergic/Immunologic    Explanation:     MEDICATIONS:    Current Facility-Administered Medications:     QUEtiapine (SEROQUEL) tablet 50 mg, 50 mg, Oral, BID, Filomena Beckham MD, 50 mg at 11/03/20 0847    QUEtiapine (SEROQUEL) tablet 25 mg, 25 mg, Oral, Once, Filomena Beckham MD    acetaminophen (TYLENOL) tablet 650 mg, 650 mg, Oral, Q4H PRN, Murtaza Valentine MD    aluminum & magnesium hydroxide-simethicone (MAALOX) 200-200-20 MG/5ML suspension 30 mL, 30 mL, Oral, Q6H PRN, Murtaza Valentine MD    hydrOXYzine (ATARAX) tablet 50 mg, 50 mg, Oral, TID PRN, Murtaza Valentine MD, 50 mg at 11/02/20 2128    ibuprofen (ADVIL;MOTRIN) tablet 400 mg, 400 mg, Oral, Q6H PRN, Murtaza Valentine MD    polyethylene glycol (GLYCOLAX) packet 17 g, 17 g, Oral, Daily PRN, Murtaza Valentine MD    nicotine polacrilex (NICORETTE) gum 2 mg, 2 mg, Oral, PRN, Murtaza Valentine MD    traZODone (DESYREL) tablet 50 mg, 50 mg, Oral, Nightly PRN, Murtaza Valentine MD, 50 mg at 11/02/20 2128      Examination:  /73   Pulse 90   Temp 97.5 °F (36.4 °C) (Oral)   Resp 14   Ht 6' 1\" (1.854 m)   Wt 174 lb (78.9 kg)   BMI 22.96 kg/m²   Gait - steady  Medication side effects(SE): none    Mental Status Examination:    Level of consciousness: Alert  Appearance:  poor grooming and poor hygiene  Behavior/Motor: Calm no psychomotor abnormalities  Attitude toward examiner:  cooperative  Speech: Normal rate and volume  Mood: anxious and constricted  Affect:  mood congruent  Thought processes: Logical and coherent  Thought content:  Homicidal ideation - none  Suicidal Ideation:  passive  Delusions:  no evidence of delusions  Perceptual Disturbance:  denies any perceptual disturbance  Cognition:  oriented to person, place, and time   Concentration intact  Insight fair   Judgement fair     ASSESSMENT:   Patient symptoms are:  [] Well controlled  [x] Improving  [] Worsening  [] No change      Diagnosis:  Principal Problem:    Bipolar affective disorder, current episode depressed ([facility] [address]  Resolved Problems:    * No resolved hospital problems. *      LABS:    No results for input(s): WBC, HGB, PLT in the last 72 hours. No results for input(s): NA, K, CL, CO2, BUN, CREATININE, GLUCOSE in the last 72 hours. No results for input(s): BILITOT, ALKPHOS, AST, ALT in the last 72 hours. No results found for: Dorothy Gall, LABBENZ, CANNAB, COCAINESCRN, LABMETH, [address], PHENCYCLIDINESCREENURINE, PPXUR, ETOH  No results found for: TSH, FREET4  No results found for: LITHIUM  No results found for: VALPROATE, CBMZ    RISK ASSESSMENT: Moderate risk of suicide. Low risk of harm to others    Treatment Plan:  Increase Seroquel to 100 mg twice a day    Risks, benefits, side effects, drug-to-drug interactions and alternatives to treatment were discussed. The patient  consented to treatment. Encourage patient to attend group and other milieu activities.   Discharge planning discussed with the patient and

## 2020-11-03 NOTE — PLAN OF CARE
Problem: Altered Mood, Depressive Behavior:  Goal: Able to verbalize and/or display a decrease in depressive symptoms  Description: Able to verbalize and/or display a decrease in depressive symptoms  11/2/2020 2200 by Radha Sauer  Outcome: Ongoing   Coop. With vitals taken. Spends time in milieu. Relates is feeling much better each day. Depression and anxiety are both better. Hope that the medication continues to help. Looking forward to going home. Nourishment taken well. Accepting of all medications provided. Problem: Altered Mood, Depressive Behavior:  Goal: Absence of self-harm  Description: Absence of self-harm  11/2/2020 2200 by Radha Sauer  Outcome: Ongoing   No self harm attempts noted  Problem: Suicide risk  Goal: Provide patient with safe environment  Description: Provide patient with safe environment  11/2/2020 2200 by Radha Sauer  Outcome: Ongoing   Remains safe , no thoughts of elf harm.

## 2020-11-03 NOTE — GROUP NOTE
Group Therapy Note    Date: 11/3/2020    Group Start Time: 1000  Group End Time: 4180  Group Topic: Psychoeducation    [facility]    [place], [address]        Group Therapy Note    Attendees: 4/10         Patient's Goal:  To increase interpersonal interaction. Notes:  Pt attended and participated in group. Status After Intervention:  Improved    Participation Level:  Active Listener and Interactive    Participation Quality: Appropriate and Attentive      Speech:  normal      Thought Process/Content: Logical      Affective Functioning: Congruent      Mood: euthymic      Level of consciousness:  Alert and Attentive      Response to Learning: Progressing to goal      Endings: None Reported    Modes of Intervention: Socialization, Problem-solving, Activity, Media and Reality-testing      Discipline Responsible: Psychoeducational Specialist      Signature:  Inessa Finley

## 2020-11-03 NOTE — GROUP NOTE
Group Therapy Note    Date: 11/3/2020    Group Start Time: 1100  Group End Time: 1853  Group Topic: Psychotherapy    [facility]        Group Therapy Note    Attendees: 4/10           Patient's Goal:  To focus on the here and now with mental health stability. Verbalize acceptance of situations they cannot control. Notes: Able to work on socialization and processing emotions during group. Status After Intervention:  Unchanged    Participation Level:  Active Listener and Interactive    Participation Quality: Appropriate, Attentive, Sharing and Supportive      Speech:  normal      Thought Process/Content: Linear      Affective Functioning: Congruent      Mood: euthymic      Level of consciousness:  Alert, Oriented x4 and Attentive      Response to Learning: Progressing to goal      Endings: None Reported    Modes of Intervention: Education, Support, Socialization and Exploration      Discipline Responsible: /Counselor          Signature:  DIANA Kaplan

## 2020-11-03 NOTE — GROUP NOTE
Group Therapy Note    Date: 11/3/2020    Group Start Time: 0900  Group End Time: 0915  Group Topic: Community Meeting    [facility] Garcia, [address]        Group Therapy Note    Attendees: 8/21         Patient's Goal:  To increase interpersonal interactions. Notes:  Pt attended and participated in group. Status After Intervention:  Improved    Participation Level:  Active Listener and Interactive    Participation Quality: Appropriate, Attentive and Sharing      Speech:  normal      Thought Process/Content: Logical      Affective Functioning: Congruent      Mood: euthymic      Level of consciousness:  Alert and Attentive      Response to Learning: Able to verbalize current knowledge/experience, Able to retain information and Progressing to goal      Endings: None Reported    Modes of Intervention: Education, Support, Socialization, Clarifying and Reality-testing      Discipline Responsible: Psychoeducational Specialist      Signature:  Jose Muse

## 2020-11-04 PROCEDURE — 99232 SBSQ HOSP IP/OBS MODERATE 35: CPT | Performed by: PSYCHIATRY & NEUROLOGY

## 2020-11-04 PROCEDURE — 1240000000 HC EMOTIONAL WELLNESS R&B

## 2020-11-04 PROCEDURE — 6370000000 HC RX 637 (ALT 250 FOR IP): Performed by: PSYCHIATRY & NEUROLOGY

## 2020-11-04 PROCEDURE — 6370000000 HC RX 637 (ALT 250 FOR IP): Performed by: NURSE PRACTITIONER

## 2020-11-04 RX ADMIN — QUETIAPINE FUMARATE 100 MG: 100 TABLET ORAL at 21:04

## 2020-11-04 RX ADMIN — HYDROXYZINE HYDROCHLORIDE 50 MG: 50 TABLET, FILM COATED ORAL at 10:46

## 2020-11-04 RX ADMIN — HYDROXYZINE HYDROCHLORIDE 50 MG: 50 TABLET, FILM COATED ORAL at 21:04

## 2020-11-04 RX ADMIN — TRAZODONE HYDROCHLORIDE 50 MG: 50 TABLET ORAL at 21:04

## 2020-11-04 RX ADMIN — QUETIAPINE FUMARATE 100 MG: 100 TABLET ORAL at 08:43

## 2020-11-04 NOTE — GROUP NOTE
Group Therapy Note    Date: 11/4/2020    Group Start Time: 0900  Group End Time: 0915  Group Topic: Community Meeting    [facility]        Group Therapy Note    Attendees: 6/20       Patient's Goal:  To orient to unit and set a daily goal     Notes:  Patient attended and participated in group. Daily goal:  Go to groups and work on art    Status After Intervention:  Improved    Participation Level:  Active Listener and Interactive    Participation Quality: Appropriate, Attentive and Sharing      Speech:  normal      Thought Process/Content: Logical  Linear      Affective Functioning: Congruent        Mood: euthymic      Level of consciousness:  Alert and Attentive      Response to Learning: Able to verbalize current knowledge/experience and Progressing to goal      Endings: None Reported    Modes of Intervention: Education, Support, Socialization, Exploration, Clarifying and Reality-testing      Discipline Responsible: Psychoeducational Specialist      Signature:  Black Lorenzo

## 2020-11-04 NOTE — H&P
HISTORY and [facility] [address] [address]       NAME:  Yves Jeans  MRN: [id number]   YOB: 1990   Date: 11/4/2020   Age: 26 y.o. Gender: male     COMPLAINT AND PRESENT HISTORY:    Yves Jeans is a 26 y.o.  male, admitted because of increasing depression with suicidal ideation. According to ED/ Admission notes, came in with suicide attempt. By cutting his left wrist with broken piece of glass. Reports stating that patient broke up with his significant other. Upon speaking to patient he does admit to suicidal ideations depression and anxiety he states that he has had multiple attempts in the past and he decided to come in because he realized the next time would have been a more drastic and with  fatal approach. patient states he used to say he would be dead by age 26 and now he has decided to want to live longer for his kids. Patient states he has been on he was on psychiatric medications long ago and decided to discontinue. Patient admits to sleep disturbances he states he stays up all night every night. Patient states his appetite varies with anxiety and depression he will generally stop eating for up to 4 days. Patient does admit to issues with his memory and concentration and focus. Patient does admit to smoking pot only,  he denies any alcohol use. Patient states upon discharge he will be returning home with his roommates his girlfriend and kids. Patient denies any significant somatic complaints but does mention that he has a condition when he stretches he will faint patient states this happens pretty often with onset of  10 years in he had a attempt prior to admission. Patient denies any somatic complaints. No significant lab values or procedures. No  chest pain or  shortness of breath. No fever/chills. Please see patient's psychiatric hx for more information.     DIAGNOSTIC RESULTS     PAST MEDICAL HISTORY     Past Medical History:   Diagnosis Date    Depression     Ear infection     multiple over since 2011    History of suicide attempt     Self-mutilation     Cutting his wrist and cigarette burn marks to the chest    Syncope     after stretching     Tinnitus of left ear      Pt denies any history of Diabetes mellitus type 2, hypertension, stroke, heart disease, COPD, Asthma, GERD, HLD, Cancer, Seizures,Thyroid disease, Kidney Disease, Hepatitis, TB.    SURGICAL HISTORY       Past Surgical History:   Procedure Laterality Date    APPENDECTOMY      WISDOM TOOTH EXTRACTION         FAMILY HISTORY       Family History   Family history unknown: Yes       SOCIAL HISTORY       Social History     Socioeconomic History    Marital status: Legally      Spouse name: None    Number of children: None    Years of education: None    Highest education level: None   Occupational History    None   Social Needs    Financial resource strain: None    Food insecurity     Worry: None     Inability: None    Transportation needs     Medical: None     Non-medical: None   Tobacco Use    Smoking status: Current Every Day Smoker     Packs/day: 1.00     Types: Cigarettes   Substance and Sexual Activity    Alcohol use: Yes     Comment: socially    Drug use: No    Sexual activity: None   Lifestyle    Physical activity     Days per week: None     Minutes per session: None    Stress: None   Relationships    Social connections     Talks on phone: None     Gets together: None     Attends Yazidi service: None     Active member of club or organization: None     Attends meetings of clubs or organizations: None     Relationship status: None    Intimate partner violence     Fear of current or ex partner: None     Emotionally abused: None     Physically abused: None     Forced sexual activity: None   Other Topics Concern    None   Social History Narrative    None           REVIEW OF SYSTEMS      No Known Allergies    No current facility-administered medications on file prior to encounter. Current Outpatient Medications on File Prior to Encounter   Medication Sig Dispense Refill    ibuprofen (ADVIL;MOTRIN) 600 MG tablet Take 600 mg by mouth every 6 hours as needed for Pain.  HYDROcodone-acetaminophen (NORCO) 5-325 MG per tablet Take 1 tablet by mouth every 6 hours as needed for Pain. 20 tablet 0                      General health:  Fairly good. No fever or chills. Skin:  No itching, redness or rash. Head, eyes, ears, nose, throat:  No headache, epistaxis, rhinorrhea hearing loss or sore throat. Neck:  No pain, stiffness or masses. Cardiovascular/Respiratory system:  No chest pain, palpitation, shortness of breath, coughing or expectoration. Gastrointestinal tract: No abdominal pain, nausea, vomiting, dysphagia, diarrhea or constipation. Genitourinary:  No burning on micturition. No hesitancy, urgency, frequency or discoloration of urine. Locomotor:  No bone or joint pains. No swelling or deformities. Neuropsychiatric:  See HPI. GENERAL PHYSICAL EXAM:     Vitals: /75   Pulse 74   Temp 97.5 °F (36.4 °C) (Oral)   Resp 14   Ht 6' 1\" (1.854 m)   Wt 174 lb (78.9 kg)   BMI 22.96 kg/m²  Body mass index is 22.96 kg/m². Pt was examined with a nurse present in the room. GENERAL APPEARANCE:  Dick Sweeney is 26 y.o.,  male, not obese, nourished, conscious, alert. Does not appear to be distress or pain at this time. SKIN:  Warm, dry, no cyanosis or jaundice. Superficial cuts to left wrist, cigarette burn marks to chest area ( 7-8) . Multiple erythemic papules to right forearm. HEAD:  Normocephalic, atraumatic, no swelling or tenderness. EYES:  Pupils equal, reactive to light, Conjunctiva is clear, EOMs intact winter. eyelids WNL. EARS:  No discharge, no marked hearing loss. NOSE:  No rhinorrhea, epistaxis or septal deformity. THROAT:  Not congested.  No ulceration bleeding or discharge. NECK:  No stiffness, trachea central.  No palpable masses or L.N.      CHEST:  Symmetrical and equal on expansion. HEART:  Regular rate and rhythm. S1 > S2, No audible murmurs or gallops. LUNGS:  Equal on expansion, normal breath sounds. No adventitious sounds. ABDOMEN:  Obese. Soft on palpation. No localized tenderness. No guarding or rigidity. No palpable organomegaly. LYMPHATICS:  No palpable cervical Lymphadenopathy. LOCOMOTOR, BACK AND SPINE:  No tenderness or deformities. EXTREMITIES:  Symmetrical, no pretibial edema. Tessas sign negative. No discoloration or ulcerations. NEUROLOGIC:  The patient is conscious, alert, oriented,Cranial nerve II-XII intact, taste and smell were not examined. No apparent focal sensory or motor deficits. Muscle strength equal Anibal. No facial droop, tongue protrudes centrally, no slurring of the speech. PROVISIONAL DIAGNOSES:      Principal Problem:    Bipolar affective disorder, current episode depressed ([facility] [address]  Active Problems:    Self-mutilation  Resolved Problems:    * No resolved hospital problems.  *      GOLDIE MONTANA, OPAL - CNP on 11/4/2020 at 2:56 PM

## 2020-11-04 NOTE — PLAN OF CARE
Problem: Altered Mood, Depressive Behavior:  Goal: Able to verbalize and/or display a decrease in depressive symptoms  Description: Able to verbalize and/or display a decrease in depressive symptoms  Note: Pt admits to having thoughts of self harm. Agreeable to seeking out staff should feelings increase. Able to identify positive coping skills and plan for safety. Will cont to monitor q15 minutes for safety and provide support and reassurance as needed. Pt states thoughts are less frequent and easy to control. PT denies current plan or intent. PT remains flat and guarded and aloof to self on unit. Problem: Altered Mood, Depressive Behavior:  Goal: Absence of self-harm  Description: Absence of self-harm  Note: Pt remains free of self harm on unit.

## 2020-11-04 NOTE — GROUP NOTE
Group Therapy Note    Date: 11/4/2020    Group Start Time: 1100  Group End Time: 1140  Group Topic: Psychotherapy    DIANA Bhakta        Group Therapy Note    Attendees: 7/10           Patient's Goal:  To focus on the here and now with mental health stability. Verbalize acceptance of situations they cannot control. Notes: Able to work on socialization and processing emotions during group. Status After Intervention:  Unchanged    Participation Level:  Active Listener and Interactive    Participation Quality: Appropriate, Attentive, Sharing and Supportive      Speech:  normal      Thought Process/Content: Linear      Affective Functioning: Congruent      Mood: euthymic      Level of consciousness:  Alert, Oriented x4 and Attentive      Response to Learning: Progressing to goal      Endings: None Reported    Modes of Intervention: Education, Support, Socialization and Exploration      Discipline Responsible: /Counselor      Signature:  [address], LSW

## 2020-11-04 NOTE — GROUP NOTE
Group Therapy Note    Date: 11/4/2020    Group Start Time: 1430  Group End Time: 7991  Group Topic: Recreational    Emmy Driver        Group Therapy Note    Attendees: 2/14         Patient's Goal:  To increase interpersonal interaction. Notes:  Pt attended and participated in group. Status After Intervention:  Improved    Participation Level:  Active Listener and Interactive    Participation Quality: Appropriate and Attentive      Speech:  normal      Thought Process/Content: Logical      Affective Functioning: Congruent      Mood: euthymic      Level of consciousness:  Alert and Attentive      Response to Learning: Progressing to goal      Endings: None Reported    Modes of Intervention: Socialization, Problem-solving, Activity and Reality-testing      Discipline Responsible: Psychoeducational Specialist      Signature:  Odean Lefort

## 2020-11-04 NOTE — PROGRESS NOTES
Pharmacy Med Education Group Note    Date: 11/4/2020  Start Time: 36  End Time: 1700    Number Participants in Group:  7    Goal:  Patient will demonstrate an understanding of the medications intended purpose and possible adverse effects  Topic: [place] for Pharmacy Med Ed Group    Discipline Responsible:     OT  AT  [facility].  RT     X Other       Participation Level:     None  Minimal      X Active Listener    X Interactive    Monopolizing         Participation Quality:    X Appropriate  Inappropriate     X       Attentive        Intrusive          Sharing        Resistant          Supportive        Lethargic       Affective:     X Congruent  Incongruent  Blunted  Flat    Constricted  Anxious  Elated  Angry    Labile  Depressed  Other         Cognitive:    X Alert  Oriented PPTP     Concentration   X G  F  P   Attention Span   X G  F  P   Short-Term Memory   X G  F  P   Long-Term Memory  G  F  P   ProblemSolving/  Decision Making  G  F  P   Ability to Process  Information   X G  F  P      Contributing Factors             Delusional             Hallucinating             Flight of Ideas             Other:       Modes of Intervention:    X Education   X Support  Exploration    Clarifying  Problem Solving  Confrontation    Socialization  Limit Setting  Reality Testing    Activity  Movement  Media    Other:            Response to Learning:    X Able to verbalize current knowledge/experience    Able to verbalize/acknowledge new learning    Able to retain information    Capable of insight    Able to change behavior    Progressing to goal    Other:        Comments:     Lorri Vázquez, PharmD, 11/4/2020 5:07 PM

## 2020-11-04 NOTE — PROGRESS NOTES
BEHAVIORAL HEALTH FOLLOW-UP NOTE     11/4/2020     Patient was seen and examined in person, Chart reviewed   Patient's case discussed with staff/team    Chief Complaint: Suicidal ideation    Interim History:     The patient reports a slight improvement in his mood. He states he feels less paranoid. He is continuing to experience some suicidal ideation. He has been participating in groups.   He has been taking his medications which he finds beneficial.  He denies any side effects    /75   Pulse 74   Temp 97.5 °F (36.4 °C) (Oral)   Resp 14   Ht 6' 1\" (1.854 m)   Wt 174 lb (78.9 kg)   BMI 22.96 kg/m²   Appetite:   [x] Normal/Unchanged  [] Increased  [] Decreased      Sleep:       [] Normal/Unchanged  [x] Fair       [] Poor              Energy:    [] Normal/Unchanged  [] Increased  [x] Decreased        Aggression:  [] yes  [x] no    Patient is [x] able  [] unable to CONTRACT FOR SAFETY ON THE UNIT    PAST MEDICAL/PSYCHIATRIC HISTORY:   Past Medical History:   Diagnosis Date    Depression     Ear infection     multiple over since 2011    History of suicide attempt     Self-mutilation     Cutting his wrist and cigarette burn marks to the chest    Syncope     after stretching     Tinnitus of left ear        FAMILY/SOCIAL HISTORY:  Family History   Family history unknown: Yes     Social History     Socioeconomic History    Marital status: Legally      Spouse name: Not on file    Number of children: Not on file    Years of education: Not on file    Highest education level: Not on file   Occupational History    Not on file   Social Needs    Financial resource strain: Not on file    Food insecurity     Worry: Not on file     Inability: Not on file    Transportation needs     Medical: Not on file     Non-medical: Not on file   Tobacco Use    Smoking status: Current Every Day Smoker     Packs/day: 1.00     Types: Cigarettes   Substance and Sexual Activity    Alcohol use: Yes     Comment: socially    Drug use: No    Sexual activity: Not on file   Lifestyle    Physical activity     Days per week: Not on file     Minutes per session: Not on file    Stress: Not on file   Relationships    Social connections     Talks on phone: Not on file     Gets together: Not on file     Attends Taoism service: Not on file     Active member of club or organization: Not on file     Attends meetings of clubs or organizations: Not on file     Relationship status: Not on file    Intimate partner violence     Fear of current or ex partner: Not on file     Emotionally abused: Not on file     Physically abused: Not on file     Forced sexual activity: Not on file   Other Topics Concern    Not on file   Social History Narrative    Not on file           ROS:  [x] All negative/unchanged except if checked.  Explain positive(checked items) below:  [] Constitutional  [] Eyes  [] Ear/Nose/Mouth/Throat  [] Respiratory  [] CV  [] GI  []   [] Musculoskeletal  [] Skin/Breast  [] Neurological  [] Endocrine  [] Heme/Lymph  [] Allergic/Immunologic    Explanation:     MEDICATIONS:    Current Facility-Administered Medications:     QUEtiapine (SEROQUEL) tablet 100 mg, 100 mg, Oral, BID, OPAL Pereira - CNP, 100 mg at 11/04/20 0843    acetaminophen (TYLENOL) tablet 650 mg, 650 mg, Oral, Q4H PRN, Julieanne Alpers, MD    aluminum & magnesium hydroxide-simethicone (MAALOX) 200-200-20 MG/5ML suspension 30 mL, 30 mL, Oral, Q6H PRN, Julieanne Alpers, MD    hydrOXYzine (ATARAX) tablet 50 mg, 50 mg, Oral, TID PRN, Julieanne Alpers, MD, 50 mg at 11/04/20 1046    ibuprofen (ADVIL;MOTRIN) tablet 400 mg, 400 mg, Oral, Q6H PRN, Julieanne Alpers, MD    polyethylene glycol (GLYCOLAX) packet 17 g, 17 g, Oral, Daily PRN, Julieanne Alpers, MD    nicotine polacrilex (NICORETTE) gum 2 mg, 2 mg, Oral, PRN, Julieanne Alpers, MD    traZODone (DESYREL) tablet 50 mg, 50 mg, Oral, Nightly PRN, Julieanne Alpers, MD, 50 mg at 11/03/20 2138      Examination:  /75   Pulse 74   Temp 97.5 °F (36.4 °C) (Oral)   Resp 14   Ht 6' 1\" (1.854 m)   Wt 174 lb (78.9 kg)   BMI 22.96 kg/m²   Gait - steady  Medication side effects(SE): none    Mental Status Examination:    Level of consciousness: Alert  Appearance:  poor grooming and poor hygiene  Behavior/Motor: Calm no psychomotor abnormalities  Attitude toward examiner:  cooperative  Speech: Normal rate and volume  Mood: anxious and constricted  Affect:  mood congruent  Thought processes: Logical and coherent  Thought content:  Homicidal ideation - none  Suicidal Ideation:  passive  Delusions:  no evidence of delusions  Perceptual Disturbance:  denies any perceptual disturbance  Cognition:  oriented to person, place, and time   Concentration intact  Insight fair   Judgement fair     ASSESSMENT:   Patient symptoms are:  [] Well controlled  [x] Improving  [] Worsening  [] No change      Diagnosis:  Principal Problem:    Bipolar affective disorder, current episode depressed ([facility] [address]  Active Problems:    Self-mutilation  Resolved Problems:    * No resolved hospital problems. *      LABS:    No results for input(s): WBC, HGB, PLT in the last 72 hours. No results for input(s): NA, K, CL, CO2, BUN, CREATININE, GLUCOSE in the last 72 hours. No results for input(s): BILITOT, ALKPHOS, AST, ALT in the last 72 hours. No results found for: Arnaldo Chessman, LABBENZ, CANNAB, COCAINESCRN, LABMETH, [address], PHENCYCLIDINESCREENURINE, PPXUR, ETOH  No results found for: TSH, FREET4  No results found for: LITHIUM  No results found for: VALPROATE, CBMZ    RISK ASSESSMENT: Moderate risk of suicide. Low risk of harm to others    Treatment Plan: Patient's medications were discussed. No changes to medications today    Risks, benefits, side effects, drug-to-drug interactions and alternatives to treatment were discussed. The patient  consented to treatment.      Encourage patient to attend group and other milieu activities. Discharge planning discussed with the patient and treatment team.  Follow-up daily while inpatient    PSYCHOTHERAPY/COUNSELING:  [] Therapeutic interview  [x] Supportive  [] CBT  [] Ongoing  [] Other    [x] Patient continues to need, on a daily basis, active treatment furnished directly by or requiring the supervision of inpatient psychiatric personnel      Anticipated Length of stay:2-3 days.                                           Araceli Hillman is a 26 y.o. male being evaluated face to face.     --Cynthia Shannon MD on 11/4/2020 at 5:02 PM

## 2020-11-05 PROCEDURE — 1240000000 HC EMOTIONAL WELLNESS R&B

## 2020-11-05 PROCEDURE — 99232 SBSQ HOSP IP/OBS MODERATE 35: CPT | Performed by: PSYCHIATRY & NEUROLOGY

## 2020-11-05 PROCEDURE — 6370000000 HC RX 637 (ALT 250 FOR IP): Performed by: PSYCHIATRY & NEUROLOGY

## 2020-11-05 PROCEDURE — 6370000000 HC RX 637 (ALT 250 FOR IP): Performed by: NURSE PRACTITIONER

## 2020-11-05 RX ORDER — QUETIAPINE FUMARATE 200 MG/1
200 TABLET, FILM COATED ORAL NIGHTLY
Status: DISCONTINUED | OUTPATIENT
Start: 2020-11-05 | End: 2020-11-06 | Stop reason: HOSPADM

## 2020-11-05 RX ORDER — QUETIAPINE FUMARATE 100 MG/1
100 TABLET, FILM COATED ORAL DAILY
Status: DISCONTINUED | OUTPATIENT
Start: 2020-11-06 | End: 2020-11-06 | Stop reason: HOSPADM

## 2020-11-05 RX ADMIN — QUETIAPINE FUMARATE 200 MG: 200 TABLET ORAL at 21:04

## 2020-11-05 RX ADMIN — QUETIAPINE FUMARATE 100 MG: 100 TABLET ORAL at 08:11

## 2020-11-05 RX ADMIN — TRAZODONE HYDROCHLORIDE 50 MG: 50 TABLET ORAL at 21:04

## 2020-11-05 RX ADMIN — HYDROXYZINE HYDROCHLORIDE 50 MG: 50 TABLET, FILM COATED ORAL at 21:04

## 2020-11-05 NOTE — GROUP NOTE
Group Therapy Note    Date: 11/5/2020    Group Start Time: 1100  Group End Time: 1130  Group Topic: Psychotherapy    DIANA Bhakta        Group Therapy Note    Attendees: 3/7         Patient's Goal:  To focus on the here and now with mental health stability. Verbalize acceptance of situations they cannot control. Notes: Able to work on socialization and processing emotions during group. Status After Intervention:  Unchanged    Participation Level:  Active Listener and Interactive    Participation Quality: Appropriate, Attentive, Sharing and Supportive      Speech:  normal      Thought Process/Content: Linear      Affective Functioning: Congruent      Mood: euthymic      Level of consciousness:  Alert, Oriented x4 and Attentive      Response to Learning: Progressing to goal      Endings: None Reported    Modes of Intervention: Education, Support, Socialization and Exploration      Discipline Responsible: /Counselor      Signature:  DIANA Nelson

## 2020-11-05 NOTE — GROUP NOTE
Group Therapy Note    Date: 11/5/2020    Group Start Time: 1000  Group End Time: 0425  Group Topic: Recreational    LILIAN WHIPPLE    Judeen Klinefelter        Group Therapy Note    Attendees: 2/7         Patient's Goal:  Patients requested coloring. Provided coloring materials for patients,a dn engaged patients in conversation to increase socialization and self-expression. Group involved confrontational conversations after one patient had poor meeting with doctor and returned to group upset. Notes:  Patient attended and participated in group. Engaged in appropriate conversation and had positive interactions with staff and peer during group. Patient was pulled from group for meeting with doctor and returned upset and agitated. Patient was angry and perseverating on not discharging today and stating the doctor is \"keeping me because I was tossing and turning a few times last night. \" Patient was in and out of group, stopping and coloring with peers, and then pacing in hallways, then returning to coloring. Status After Intervention:  Improved    Participation Level: Active Listener and Interactive    Participation Quality: Appropriate, Attentive and Sharing      Speech:  normal      Thought Process/Content: Logical  Linear       Affective Functioning: Congruent      Mood: euthymic at beginning of group;  Angry when returned to group from doctors meeting      Level of consciousness:  Alert and Attentive      Response to Learning: Progressing to goal      Endings: None Reported    Modes of Intervention: Support, Socialization, Exploration, Clarifying, Problem-solving, Activity, Confrontation and Reality-testing      Discipline Responsible: Psychoeducational Specialist      Signature:  Judeen Klinefelter

## 2020-11-05 NOTE — PLAN OF CARE
Problem: Altered Mood, Depressive Behavior:  Goal: Able to verbalize and/or display a decrease in depressive symptoms  Description: Able to verbalize and/or display a decrease in depressive symptoms  Note: Pt states he is feeling better and more hopeful. Pt is out more and appears brighter on unit social with select peers. PT does states that he is still not sleeping well but states he does not want to tell doctor because he does not want to stay longer. PT encouraged to speak with doctor. PT is taking ordered medications. PT remains disheveled. Problem: Altered Mood, Depressive Behavior:  Goal: Absence of self-harm  Description: Absence of self-harm  Note: Pt denies thoughts of self harm and is agreeable to seeking out should thoughts of self harm arise. Safe environment maintained. Q15 minute checks for safety cont per unit policy. Will cont to monitor for safety and provides support and reassurance as needed.

## 2020-11-05 NOTE — GROUP NOTE
Group Therapy Note    Date: 11/5/2020    Group Start Time: 1330  Group End Time: 4358  Group Topic: Recreational    LILIAN WHIPPLE    [place], [place]    Attendees: 6         Patient's Goal:  To demonstrate increased interpersonal skills. Notes:  Patient attended group and actively participated in task at hand. Patient conversed appropriately with peers and Anthony Pineda. Status After Intervention:  Improved    Participation Level:  Active Listener and Interactive    Participation Quality: Appropriate, Attentive and Sharing      Speech:  normal      Thought Process/Content: Logical      Affective Functioning: Congruent      Mood: irritable      Level of consciousness:  Alert, Oriented x4 and Attentive      Response to Learning: Able to verbalize current knowledge/experience, Able to verbalize/acknowledge new learning, Able to retain information, Capable of insight and Progressing to goal      Endings: None Reported       Modes of Intervention: Socialization, Exploration, Clarifying, Problem-solving, Activity, Limit-setting and Reality-testing      Discipline Responsible: Psychoeducational Specialist      Signature:  Dayana Gamboa

## 2020-11-05 NOTE — GROUP NOTE
Group Therapy Note    Date: 11/5/2020    Group Start Time: 0900  Group End Time: 0915  Group Topic: Community Meeting    DELFINO EDGE A Bedford Goldmann        Group Therapy Note    Attendees: 6/15       Patient's Goal:  To orient to unit and set a daily goal    Notes:  Patient attended and participated in group. Daily goal: Shower and do laundry    Status After Intervention:  Improved    Participation Level:  Active Listener and Interactive    Participation Quality: Appropriate, Attentive and Sharing      Speech:  normal      Thought Process/Content: Logical  Linear      Affective Functioning: Congruent      Mood: euthymic      Level of consciousness:  Alert and Attentive      Response to Learning: Able to verbalize current knowledge/experience and Progressing to goal      Endings: None Reported    Modes of Intervention: Education, Support, Socialization, Exploration, Clarifying and Reality-testing      Discipline Responsible: Psychoeducational Specialist      Signature:  Bedford Goldmann

## 2020-11-05 NOTE — GROUP NOTE
Group Therapy Note    Date: 11/5/2020    Group Start Time: 1600  Group End Time: 3712  Group Topic: Group Documentation    LILIAN KELY WHIPPLE    Hoda Rios        Group Therapy Note    Attendees: 4/15         Patient's Goal:  Attend and participate in wellness group  Notes: Green Behavior Scale - Identifying life enhancing areas    Status After Intervention:  Improved    Participation Level:  Active Listener and Interactive    Participation Quality: Appropriate, Attentive, Sharing and Supportive      Speech:  normal      Thought Process/Content: Logical      Affective Functioning: Congruent      Mood: anxious      Level of consciousness:  Alert, Oriented x4 and Attentive      Response to Learning: Able to verbalize current knowledge/experience, Able to verbalize/acknowledge new learning, Able to retain information and Capable of insight      Endings: None Reported    Modes of Intervention: Education, Support, Socialization, Exploration and Problem-solving      Discipline Responsible: [address], [employer]      Signature:  Hoda Rios

## 2020-11-05 NOTE — BH NOTE
Patient returned from doctors meeting in the middle of group agitated. Stated he should have been discharged today but the doctor is \"keeping him for tossing and turning a few times in the middle of the night. \" Patient was perseverating on this and receptive at times to support and validation. Patient stated he would self-sabotage and \"go off my medication, go back to the same unhealthy things I was doing before, and try to kill myself. \" because he was feeling discouraged following doctors meeting. Continued to brighten with support and validation, though still challenging doctors decision.

## 2020-11-05 NOTE — PROGRESS NOTES
BEHAVIORAL HEALTH FOLLOW-UP NOTE     11/5/2020     Patient was seen and examined in person, Chart reviewed   Patient's case discussed with staff/team    Chief Complaint: Suicidal ideation    Interim History:   Per staff patient compliant with medications and attending groups. Interviewed patient in his room. Patient reports an slight improvement with sleep last night, however still woke up in the middle of the night once and found it difficult to fall back asleep. He reports slight improvement in his mood and a denies suicidal ideation.   Patient denies any medication side effects      /66   Pulse 65   Temp 97.8 °F (36.6 °C) (Oral)   Resp 16   Ht 6' 1\" (1.854 m)   Wt 174 lb (78.9 kg)   BMI 22.96 kg/m²   Appetite:   [x] Normal/Unchanged  [] Increased  [] Decreased      Sleep:       [] Normal/Unchanged  [x] Fair       [] Poor              Energy:    [] Normal/Unchanged  [] Increased  [x] Decreased        Aggression:  [] yes  [x] no    Patient is [x] able  [] unable to CONTRACT FOR SAFETY ON THE UNIT    PAST MEDICAL/PSYCHIATRIC HISTORY:   Past Medical History:   Diagnosis Date    Depression     Ear infection     multiple over since 2011    History of suicide attempt     Self-mutilation     Cutting his wrist and cigarette burn marks to the chest    Syncope     after stretching     Tinnitus of left ear        FAMILY/SOCIAL HISTORY:  Family History   Family history unknown: Yes     Social History     Socioeconomic History    Marital status: Legally      Spouse name: Not on file    Number of children: Not on file    Years of education: Not on file    Highest education level: Not on file   Occupational History    Not on file   Social Needs    Financial resource strain: Not on file    Food insecurity     Worry: Not on file     Inability: Not on file    Transportation needs     Medical: Not on file     Non-medical: Not on file   Tobacco Use    Smoking status: Current Every Day Smoker Packs/day: 1.00     Types: Cigarettes   Substance and Sexual Activity    Alcohol use: Yes     Comment: socially    Drug use: No    Sexual activity: Not on file   Lifestyle    Physical activity     Days per week: Not on file     Minutes per session: Not on file    Stress: Not on file   Relationships    Social connections     Talks on phone: Not on file     Gets together: Not on file     Attends Buddhist service: Not on file     Active member of club or organization: Not on file     Attends meetings of clubs or organizations: Not on file     Relationship status: Not on file    Intimate partner violence     Fear of current or ex partner: Not on file     Emotionally abused: Not on file     Physically abused: Not on file     Forced sexual activity: Not on file   Other Topics Concern    Not on file   Social History Narrative    Not on file           ROS:  [x] All negative/unchanged except if checked.  Explain positive(checked items) below:  [] Constitutional  [] Eyes  [] Ear/Nose/Mouth/Throat  [] Respiratory  [] CV  [] GI  []   [] Musculoskeletal  [] Skin/Breast  [] Neurological  [] Endocrine  [] Heme/Lymph  [] Allergic/Immunologic    Explanation:     MEDICATIONS:    Current Facility-Administered Medications:     QUEtiapine (SEROQUEL) tablet 200 mg, 200 mg, Oral, Nightly, Catoosa Phi, APRN - CNP    [START ON 11/6/2020] QUEtiapine (SEROQUEL) tablet 100 mg, 100 mg, Oral, Daily, Aryan Phi, APRN - CNP    acetaminophen (TYLENOL) tablet 650 mg, 650 mg, Oral, Q4H PRN, Bronson Cox MD    aluminum & magnesium hydroxide-simethicone (MAALOX) 200-200-20 MG/5ML suspension 30 mL, 30 mL, Oral, Q6H PRN, Bronson Cox MD    hydrOXYzine (ATARAX) tablet 50 mg, 50 mg, Oral, TID PRN, Bronson Cox MD, 50 mg at 11/04/20 2104    ibuprofen (ADVIL;MOTRIN) tablet 400 mg, 400 mg, Oral, Q6H PRN, Bronson Cox MD    polyethylene glycol (GLYCOLAX) packet 17 g, 17 g, Oral, Daily PRN, Bronson Cox MD    nicotine polacrilex (NICORETTE) gum 2 mg, 2 mg, Oral, PRN, Cory Swain MD    traZODone (DESYREL) tablet 50 mg, 50 mg, Oral, Nightly PRN, Cory Swain MD, 50 mg at 11/04/20 2104      Examination:  /66   Pulse 65   Temp 97.8 °F (36.6 °C) (Oral)   Resp 16   Ht 6' 1\" (1.854 m)   Wt 174 lb (78.9 kg)   BMI 22.96 kg/m²   Gait - steady  Medication side effects(SE): none    Mental Status Examination:    Level of consciousness: Alert  Appearance:  poor grooming and poor hygiene  Behavior/Motor: Calm no psychomotor abnormalities  Attitude toward examiner:  cooperative  Speech: Normal rate and volume  Mood: anxious and constricted  Affect:  mood congruent  Thought processes: Logical and coherent  Thought content:  Homicidal ideation - none  Suicidal Ideation:  passive  Delusions:  no evidence of delusions  Perceptual Disturbance:  denies any perceptual disturbance  Cognition:  oriented to person, place, and time   Concentration intact  Insight fair   Judgement fair     ASSESSMENT:   Patient symptoms are:  [] Well controlled  [x] Improving  [] Worsening  [] No change      Diagnosis:  Principal Problem:    Bipolar affective disorder, current episode depressed ([facility] [address]  Active Problems:    Self-mutilation  Resolved Problems:    * No resolved hospital problems. *      LABS:    No results for input(s): WBC, HGB, PLT in the last 72 hours. No results for input(s): NA, K, CL, CO2, BUN, CREATININE, GLUCOSE in the last 72 hours. No results for input(s): BILITOT, ALKPHOS, AST, ALT in the last 72 hours. No results found for: Linell Killings, LABBENZ, CANNAB, COCAINESCRN, LABMETH, [address], PHENCYCLIDINESCREENURINE, PPXUR, ETOH  No results found for: TSH, FREET4  No results found for: LITHIUM  No results found for: VALPROATE, CBMZ    RISK ASSESSMENT: Moderate risk of suicide. Low risk of harm to others    Treatment Plan: Patient's medications were discussed.   Increase Seroquel to 200 mg at bedtime, continue Seroquel 100 mg in the morning    Risks, benefits, side effects, drug-to-drug interactions and alternatives to treatment were discussed. The patient  consented to treatment. Encourage patient to attend group and other milieu activities. Discharge planning discussed with the patient and treatment team.  Follow-up daily while inpatient    PSYCHOTHERAPY/COUNSELING:  [] Therapeutic interview  [x] Supportive  [] CBT  [] Ongoing  [] Other    [x] Patient continues to need, on a daily basis, active treatment furnished directly by or requiring the supervision of inpatient psychiatric personnel      Anticipated Length of stay:2-3 days. Morrell Boast is a 26 y.o. male being evaluated face to face. --OPAL Hutson - CNP on 11/5/2020 at 1:59 PM    I independently saw and evaluated the patient. I reviewed the midlevel provider's documentation above. Any additional comments or changes to the midlevel provider's documentation are stated below otherwise agree with assessment. The patient reports difficulty in sleeping. He continues to be disheveled. He reports an improvement in his mood. Patient s symptoms   are improving    PLAN  Increase Seroquel as above  Attempt to develop insight  Psycho-education conducted. Supportive Therapy conducted.   Probable discharge is tomorrow  Follow-up daily while on inpatient unit    Electronically signed by Abigail Flannery MD on 11/5/20 at 4:35 PM EST

## 2020-11-06 VITALS
RESPIRATION RATE: 14 BRPM | HEIGHT: 73 IN | DIASTOLIC BLOOD PRESSURE: 78 MMHG | BODY MASS INDEX: 23.06 KG/M2 | TEMPERATURE: 97.5 F | HEART RATE: 84 BPM | SYSTOLIC BLOOD PRESSURE: 112 MMHG | WEIGHT: 174 LBS

## 2020-11-06 PROCEDURE — 99239 HOSP IP/OBS DSCHRG MGMT >30: CPT | Performed by: PSYCHIATRY & NEUROLOGY

## 2020-11-06 PROCEDURE — 6370000000 HC RX 637 (ALT 250 FOR IP): Performed by: NURSE PRACTITIONER

## 2020-11-06 RX ORDER — QUETIAPINE FUMARATE 100 MG/1
100 TABLET, FILM COATED ORAL DAILY
Qty: 60 TABLET | Refills: 3 | Status: SHIPPED | OUTPATIENT
Start: 2020-11-07

## 2020-11-06 RX ORDER — QUETIAPINE FUMARATE 200 MG/1
200 TABLET, FILM COATED ORAL NIGHTLY
Qty: 60 TABLET | Refills: 3 | Status: SHIPPED | OUTPATIENT
Start: 2020-11-06

## 2020-11-06 RX ADMIN — QUETIAPINE FUMARATE 100 MG: 100 TABLET ORAL at 08:22

## 2020-11-06 NOTE — PROGRESS NOTES
CLINICAL PHARMACY NOTE: MEDS TO [address] Select Patient?: No  Total # of Prescriptions Filled: 2   The following medications were delivered to the patient:  · Quetiapine 200 mg  · Quetiapine 100 mg  ·   Total # of Interventions Completed: 0  Time Spent (min): 30    Additional Documentation:

## 2020-11-06 NOTE — GROUP NOTE
Group Therapy Note    Date: 11/6/2020    Group Start Time: 1330  Group End Time: 9838  Group Topic: Music Therapy    LILIAN WHIPPLE    Yony Avina        Group Therapy Note    Attendees: 5/14       Patient's Goal:  Patient selected songs from a list provided by this writer, and patients shared advice they could give that they believe related to their song choices. Notes:  Patient attended and participated in group, sharing a song and having positive interactions with peers and staff. Patient offered advice about \"being yourself\" and perseverance. Status After Intervention:  Improved    Participation Level:  Active Listener and Interactive    Participation Quality: Appropriate, Attentive, Sharing and Supportive      Speech:  normal      Thought Process/Content: Logical  Linear      Affective Functioning: Congruent      Mood: euthymic      Level of consciousness:  Alert and Attentive      Response to Learning: Able to verbalize current knowledge/experience, Capable of insight and Progressing to goal      Endings: None Reported    Modes of Intervention: Support, Socialization, Exploration, Clarifying, Activity, Limit-setting and Reality-testing      Discipline Responsible: Psychoeducational Specialist      Signature:  Yony Avina

## 2020-11-06 NOTE — PROGRESS NOTES
[facility]  Discharge Note    Pt discharged with followings belongings:   Dentures: None  Vision - Corrective Lenses: None  Hearing Aid: None  Jewelry: Other (Comment)(toungue piercing)  Body Piercings Removed: No  Clothing: Footwear, Jacket / coat, Pants, Shirt  Were All Patient Medications Collected?: Not Applicable  Other Valuables: Cell phone, Money (Comment), Wallet, Other (Comment)( license, VISA [phone])   Valuables sent home with pt. Valuables returned to patient. Patient education on aftercare instructions: yes  Information faxed to [facility] by writer Patient verbalize understanding of AVS:  yes. Status EXAM upon discharge:  Status and Exam  Normal: Yes  Facial Expression: Brightened  Affect: Appropriate  Level of Consciousness: Alert  Mood:Normal: Yes  Mood: Depressed, Anxious, Sad  Motor Activity:Normal: Yes  Motor Activity: Decreased  Interview Behavior: Cooperative  Preception: [place] to Person, Pearla Ohms to Time, [place] to Place, [place] to Situation  Attention:Normal: Yes  Attention: Distractible  Thought Processes: Circumstantial  Thought Content:Normal: Yes  Thought Content: Preoccupations  Hallucinations: None  Delusions: No  Memory:Normal: Yes  Memory: Confabulation  Insight and Judgment: Yes  Insight and Judgment: Poor Insight  Present Suicidal Ideation: No  Present Homicidal Ideation: No      Metabolic Screening:    No results found for: LABA1C    No results found for: CHOL  No results found for: TRIG  No results found for: HDL  No components found for: LDLCAL  No results found for: LABVLDL    Pt discharged to home by cab. All belongings et valuables sent home with pt. Pt verbalizes all discharge instructions. Medications filled et sent with pt.        Aurora Cool RN

## 2020-11-06 NOTE — PROGRESS NOTES
Patient given tobacco quitline number 87644615770 at this time, refusing to call at this time, states \" I just dont want to quit now\"- patient given information as to the dangers of long term tobacco use. Continue to reinforce the importance of tobacco cessation.

## 2020-11-06 NOTE — GROUP NOTE
Group Therapy Note    Date: 11/6/2020    Group Start Time: 1000  Group End Time: 4029  Group Topic: Recreational    CZ KELY Jin        Group Therapy Note    Attendees: 5/8         Patient's Goal:  Patients played Quote Roller 55 card game to increase socialization and sense of community with developmentally and age appropriate games applicable to all group members. Notes:  Patient attended and participated in group, having positive interaction with peers and staff. Status After Intervention:  Improved    Participation Level:  Active Listener and Interactive    Participation Quality: Appropriate and Attentive      Speech:  normal      Thought Process/Content: Logical  Linear      Affective Functioning: Congruent      Mood: euthymic      Level of consciousness:  Alert and Attentive      Response to Learning: Progressing to goal      Endings: None Reported    Modes of Intervention: Socialization, Activity, Limit-setting and Reality-testing      Discipline Responsible: Psychoeducational Specialist      Signature:  Kenzie Jin

## 2020-11-06 NOTE — GROUP NOTE
Group Therapy Note    Date: 11/6/2020    Group Start Time: 1100  Group End Time: 9310  Group Topic: Psychotherapy    DIANA Bhakta        Group Therapy Note    Attendees: 4/7         Patient's Goal:  To focus on the here and now with mental health stability. Verbalize acceptance of situations they cannot control. Notes: Able to work on socialization and processing emotions during group. Status After Intervention:  Unchanged    Participation Level:  Active Listener and Interactive    Participation Quality: Appropriate, Attentive, Sharing and Supportive      Speech:  normal      Thought Process/Content: Linear      Affective Functioning: Congruent      Mood: euthymic      Level of consciousness:  Alert, Oriented x4 and Attentive      Response to Learning: Progressing to goal      Endings: None Reported    Modes of Intervention: Education, Support, Socialization and Exploration      Discipline Responsible: /Counselor    Signature:  DIANA Godinez

## 2020-11-06 NOTE — SUICIDE SAFETY PLAN
SAFETY PLAN    A suicide Safety Plan is a document that supports someone when they are having thoughts of suicide. Warning Signs that indicate a suicidal crisis may be developing: What (situations, thoughts, feelings, body sensations, behaviors, etc.) do you experience that lets you know you are beginning to think about suicide? 1. Stop eating   2. Stop sleeping   3. Increased depression     Internal Coping Strategies:  What things can I do (relaxation techniques, hobbies, physical activities, etc.) to take my mind off my problems without contacting another person? 1. Walking   2. Pets   3. Make music     People and social settings that provide distraction: Who can I call or where can I go to distract me? 1. Name: Ramiro Griffith  Phone: [phone]  2. Name: Jr  Phone: [phone]   3. Place: [facility]             4. Place: [facility]   Phone : [phone]  5. [facility]     Phone: [phone]    People whom I can ask for help: Who can I call when I need help - for example, friends, family, clergy, someone else? 1. Name: Ramiro Griffith  Phone: [phone]  2. Name: Jr  Phone: [phone]   3. Name: Catalino Borja Phone: [phone]    Professionals or [address] [place] [address] I can contact during a crisis: Who can I call for help - for example, my doctor, my psychiatrist, my psychologist, a mental health provider, a suicide hotline? 1. Clinician Name: Jamey Cox    Phone: [phone]      Clinician Pager or Emergency Contact #: NA     2. . Place: [facility]   Phone : [phone]  3. [facility]     Phone: [phone]    4. Suicide Prevention Lifeline: 1-605-374-VVPL (9788)    5. [address] [place] [address] Emergency Services -  for example, [facility] suicide hotline, [facility] Hotline: 211      Emergency Services Address: [facility]. [facility]       Emergency Services Phone: [phone]    Making the environment safe:  How can I make my environment (house/apartment/living space) safer? For example, can I remove guns, medications, and other items? 1. Get rid of my noose   2.  Clean up broken glass
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
## 2020-12-08 ENCOUNTER — APPOINTMENT (OUTPATIENT)
Dept: CT IMAGING | Age: 30
End: 2020-12-08
Payer: MEDICAID

## 2020-12-08 ENCOUNTER — HOSPITAL ENCOUNTER (EMERGENCY)
Age: 30
Discharge: HOME | End: 2020-12-08
Attending: EMERGENCY MEDICINE
Payer: MEDICAID

## 2020-12-08 VITALS
HEIGHT: 73 IN | HEART RATE: 89 BPM | TEMPERATURE: 96.8 F | RESPIRATION RATE: 18 BRPM | WEIGHT: 180 LBS | OXYGEN SATURATION: 98 % | DIASTOLIC BLOOD PRESSURE: 81 MMHG | BODY MASS INDEX: 23.86 KG/M2 | SYSTOLIC BLOOD PRESSURE: 135 MMHG

## 2020-12-08 LAB
ALLEN TEST: NORMAL
ANION GAP: 9 MMOL/L (ref 7–16)
FIO2: NORMAL
GFR NON-AFRICAN AMERICAN: >60 ML/MIN
GFR SERPL CREATININE-BSD FRML MDRD: >60 ML/MIN
GFR SERPL CREATININE-BSD FRML MDRD: NORMAL ML/MIN/{1.73_M2}
GLUCOSE BLD-MCNC: 84 MG/DL (ref 74–100)
HCO3 VENOUS: 28.6 MMOL/L (ref 22–29)
MODE: NORMAL
NEGATIVE BASE EXCESS, VEN: NORMAL (ref 0–2)
O2 DEVICE/FLOW/%: NORMAL
O2 SAT, VEN: 62 % (ref 60–85)
PATIENT TEMP: NORMAL
PCO2, VEN: 46.9 MM HG (ref 41–51)
PH VENOUS: 7.39 (ref 7.32–7.43)
PO2, VEN: 32.7 MM HG (ref 30–50)
POC CHLORIDE: 104 MMOL/L (ref 98–107)
POC CREATININE: 0.82 MG/DL (ref 0.51–1.19)
POC HEMATOCRIT: 45 % (ref 41–53)
POC HEMOGLOBIN: 15.3 G/DL (ref 13.5–17.5)
POC IONIZED CALCIUM: 1.17 MMOL/L (ref 1.15–1.33)
POC LACTIC ACID: 0.34 MMOL/L (ref 0.56–1.39)
POC PCO2 TEMP: NORMAL MM HG
POC PH TEMP: NORMAL
POC PO2 TEMP: NORMAL MM HG
POC POTASSIUM: 3.6 MMOL/L (ref 3.5–4.5)
POC SODIUM: 142 MMOL/L (ref 138–146)
POSITIVE BASE EXCESS, VEN: 3 (ref 0–3)
SAMPLE SITE: NORMAL
TOTAL CO2, VENOUS: 30 MMOL/L (ref 23–30)

## 2020-12-08 PROCEDURE — 85014 HEMATOCRIT: CPT

## 2020-12-08 PROCEDURE — 82330 ASSAY OF CALCIUM: CPT

## 2020-12-08 PROCEDURE — 84295 ASSAY OF SERUM SODIUM: CPT

## 2020-12-08 PROCEDURE — 82947 ASSAY GLUCOSE BLOOD QUANT: CPT

## 2020-12-08 PROCEDURE — 6360000002 HC RX W HCPCS: Performed by: STUDENT IN AN ORGANIZED HEALTH CARE EDUCATION/TRAINING PROGRAM

## 2020-12-08 PROCEDURE — 93005 ELECTROCARDIOGRAM TRACING: CPT

## 2020-12-08 PROCEDURE — 6370000000 HC RX 637 (ALT 250 FOR IP): Performed by: STUDENT IN AN ORGANIZED HEALTH CARE EDUCATION/TRAINING PROGRAM

## 2020-12-08 PROCEDURE — 96372 THER/PROPH/DIAG INJ SC/IM: CPT

## 2020-12-08 PROCEDURE — 70450 CT HEAD/BRAIN W/O DYE: CPT

## 2020-12-08 PROCEDURE — 72125 CT NECK SPINE W/O DYE: CPT

## 2020-12-08 PROCEDURE — 82565 ASSAY OF CREATININE: CPT

## 2020-12-08 PROCEDURE — 83605 ASSAY OF LACTIC ACID: CPT

## 2020-12-08 PROCEDURE — 82803 BLOOD GASES ANY COMBINATION: CPT

## 2020-12-08 PROCEDURE — 82435 ASSAY OF BLOOD CHLORIDE: CPT

## 2020-12-08 PROCEDURE — 99283 EMERGENCY DEPT VISIT LOW MDM: CPT

## 2020-12-08 PROCEDURE — 84132 ASSAY OF SERUM POTASSIUM: CPT

## 2020-12-08 RX ORDER — ONDANSETRON 4 MG/1
4 TABLET, FILM COATED ORAL ONCE
Status: COMPLETED | OUTPATIENT
Start: 2020-12-08 | End: 2020-12-08

## 2020-12-08 RX ORDER — ORPHENADRINE CITRATE 30 MG/ML
60 INJECTION INTRAMUSCULAR; INTRAVENOUS ONCE
Status: COMPLETED | OUTPATIENT
Start: 2020-12-08 | End: 2020-12-08

## 2020-12-08 RX ADMIN — ONDANSETRON HYDROCHLORIDE 4 MG: 4 TABLET, FILM COATED ORAL at 19:46

## 2020-12-08 RX ADMIN — ORPHENADRINE CITRATE 60 MG: 60 INJECTION INTRAMUSCULAR; INTRAVENOUS at 19:46

## 2020-12-08 ASSESSMENT — PAIN DESCRIPTION - LOCATION: LOCATION: HEAD

## 2020-12-08 ASSESSMENT — PAIN DESCRIPTION - PAIN TYPE: TYPE: ACUTE PAIN

## 2020-12-08 ASSESSMENT — PAIN SCALES - GENERAL: PAINLEVEL_OUTOF10: 8

## 2020-12-08 NOTE — ED PROVIDER NOTES
[id number]     Emergency Department     Faculty Attestation    I performed a history and physical examination of the patient and discussed management with the resident. I have reviewed and agree with the residents findings including all diagnostic interpretations, and treatment plans as written at the time of my review. Any areas of disagreement are noted on the chart. I was personally present for the key portions of any procedures. I have documented in the chart those procedures where I was not present during the key portions. For Physician Assistant/ Nurse Practitioner cases/documentation I have personally evaluated this patient and have completed at least one if not all key elements of the E/M (history, physical exam, and MDM). Additional findings are as noted. This patient was evaluated in the Emergency Department for symptoms described in the history of present illness. The patient was evaluated in the context of the global COVID-19 pandemic, which necessitated consideration that the patient might be at risk for infection with the SARS-CoV-2 virus that causes COVID-19. Institutional protocols and algorithms that pertain to the evaluation of patients at risk for COVID-19 are in a state of rapid change based on information released by regulatory bodies including the CDC and federal and state organizations. These policies and algorithms were followed during the patient's care in the ED. Primary Care Physician: Bladimir Lopez    History: This is a 26 y.o. male who presents to the Emergency Department with complaint of headache and neck pain and dizziness. The patient was complains of nausea. The patient seen 4 days ago at [facility] for similar complaints. At that time he had negative labs evaluation. He says since then has been nauseated but has had no vomiting. Denies any fever, chills or sweats.   Denies any numbness, tingling or weakness. Patient stated he fell at [facility] was exacerbated his headache and neck pain. He had no imaging of his cervical spine or head. Physical:   height is 6' 1\" (1.854 m) and weight is 180 lb (81.6 kg). His temporal temperature is 96.8 °F (36 °C). His blood pressure is 135/81 and his pulse is 89. His respiration is 18 and oxygen saturation is 98%. Pupils equal round react light extraocular motions intact patient is good finger-nose coordination motor strength is 5/5 bilaterally upper lower extremity sensation light touch intact bilateral upper lower extremities. Lungs are clear auscultation bilateral, heart regular in rhythm patient has some tenderness palpation along his posterior cervical spine. Impression: Cephalgia, neck pain, dizziness    Plan: Analgesia, CT scan, EKG, EPOC      EKG Interpretation    Interpreted by me    Rhythm: normal sinus   Rate: normal  Axis: normal  Ectopy: none  Conduction: normal  ST Segments: no acute change  T Waves: no acute change  Q Waves: none    Clinical Impression: no acute changes and normal EKG      (Please note that portions of this note were completed with a voice recognition program.  Efforts were made to edit the dictations but occasionally words are mis-transcribed.)    Evelina Leong.  Chetan Mendieta MD, [facility]  Attending Emergency Medicine Physician        Clay Porter MD  12/08/20 [id number]

## 2020-12-08 NOTE — ED PROVIDER NOTES
[facility] ED  Emergency Department Encounter  EmergencyMedicine Resident     Pt Name:Greg Nichole  MRN: [id number]  [place] 1990  Date of evaluation: 12/8/20  PCP:  No primary care provider on file. CHIEF COMPLAINT       Chief Complaint   Patient presents with    Dizziness     Pt c/o dizziness/headache/neck pain, fell Friday at [facility], seen there at that time, not feeling better       HISTORY OF PRESENT ILLNESS  (Location/Symptom, Timing/Onset, Context/Setting, Quality, Duration, Modifying Factors, Severity.)      Leydi Marie is a 26 y.o. male who presents with dizziness, headache, neck pain. Patient was being evaluated [facility] on Friday, 4 days ago, for nausea and vomiting x2 days. While there, he syncopized in the bathroom, hitting his head on the ground. Chest x-ray and labs were done at that time without any abnormalities. Since his discharge home, patient has continued to experience spinning dizziness, persistent headache, and persistent neck pain. He has occasional right shoulder pain as well. Dizziness is brought on by looking up, standing up from sitting, and when laying prone. He notes some tinnitus as well. Patient has tried Tylenol and ibuprofen with little relief. No other past medical history. PAST MEDICAL / SURGICAL / SOCIAL / FAMILY HISTORY      has a past medical history of Depression, Ear infection, History of suicide attempt, Self-mutilation, Syncope, and Tinnitus of left ear.       has a past surgical history that includes Appendectomy and Cope tooth extraction.       Social History     Socioeconomic History    Marital status: Legally      Spouse name: Not on file    Number of children: Not on file    Years of education: Not on file    Highest education level: Not on file   Occupational History    Not on file   Social Needs    Financial resource strain: Not on file    Food insecurity     Worry: Not on file     Inability: Not on file  Transportation needs     Medical: Not on file     Non-medical: Not on file   Tobacco Use    Smoking status: Current Every Day Smoker     Packs/day: 1.00     Types: Cigarettes   Substance and Sexual Activity    Alcohol use: Yes     Comment: socially    Drug use: No    Sexual activity: Not on file   Lifestyle    Physical activity     Days per week: Not on file     Minutes per session: Not on file    Stress: Not on file   Relationships    Social connections     Talks on phone: Not on file     Gets together: Not on file     Attends Episcopalian service: Not on file     Active member of club or organization: Not on file     Attends meetings of clubs or organizations: Not on file     Relationship status: Not on file    Intimate partner violence     Fear of current or ex partner: Not on file     Emotionally abused: Not on file     Physically abused: Not on file     Forced sexual activity: Not on file   Other Topics Concern    Not on file   Social History Narrative    Not on file       Family History   Family history unknown: Yes       Allergies:  Patient has no known allergies. Home Medications:  Prior to Admission medications    Medication Sig Start Date End Date Taking? Authorizing Provider   QUEtiapine (SEROQUEL) 100 MG tablet Take 1 tablet by mouth daily 11/7/20   Mahogany Ricketts MD   QUEtiapine (SEROQUEL) 200 MG tablet Take 1 tablet by mouth nightly 11/6/20   Mahogany Ricketts MD   ibuprofen (ADVIL;MOTRIN) 600 MG tablet Take 600 mg by mouth every 6 hours as needed for Pain. Historical Provider, MD       REVIEW OF SYSTEMS    (2-9 systems for level 4, 10 or more for level 5)      Review of Systems   Constitutional: Negative for chills and fever. HENT: Positive for tinnitus. Negative for congestion, ear discharge and ear pain. Eyes: Negative for visual disturbance. Respiratory: Negative for cough and shortness of breath. Cardiovascular: Negative for chest pain.    Gastrointestinal: Negative for abdominal pain, diarrhea, nausea and vomiting. Musculoskeletal: Positive for arthralgias, myalgias and neck pain. Skin: Negative for wound. Neurological: Positive for dizziness, syncope, light-headedness and headaches. Negative for weakness and numbness. PHYSICAL EXAM   (up to 7 for level 4, 8 or more for level 5)      INITIAL VITALS:   /81   Pulse 89   Temp 96.8 °F (36 °C) (Temporal)   Resp 18   Ht 6' 1\" (1.854 m)   Wt 180 lb (81.6 kg)   SpO2 98%   BMI 23.75 kg/m²     Physical Exam  Constitutional:       General: He is in acute distress. Appearance: Normal appearance. He is normal weight. He is not ill-appearing, toxic-appearing or diaphoretic. Comments: Patient tearful with examination due to headache and neck pain. HENT:      Head: Normocephalic and atraumatic. Right Ear: Tympanic membrane and external ear normal.      Left Ear: Tympanic membrane and external ear normal.      Mouth/Throat:      Mouth: Mucous membranes are moist.      Pharynx: Oropharynx is clear. Neck:      Musculoskeletal: Normal range of motion. Cardiovascular:      Rate and Rhythm: Normal rate and regular rhythm. Pulmonary:      Effort: Pulmonary effort is normal.      Breath sounds: Normal breath sounds. Abdominal:      Palpations: Abdomen is soft. Musculoskeletal: Normal range of motion. Skin:     General: Skin is warm and dry. Findings: No lesion or rash. Neurological:      General: No focal deficit present. Mental Status: He is alert and oriented to person, place, and time. Cranial Nerves: No cranial nerve deficit. Sensory: No sensory deficit. Motor: No weakness.          DIFFERENTIAL  DIAGNOSIS     PLAN (LABS / IMAGING / EKG):  Orders Placed This Encounter   Procedures    CT HEAD WO CONTRAST    CT CERVICAL SPINE WO CONTRAST    Hemoglobin and hematocrit, blood    SODIUM (POC)    POTASSIUM (POC)    CHLORIDE (POC)    CALCIUM, IONIC (POC)    POCT Glucose  Venous Blood Gas, POC    Creatinine W/GFR Point of Care    Lactic Acid, POC    POCT Glucose    Anion Gap (Calc) POC    EKG 12 Lead       MEDICATIONS ORDERED:  Orders Placed This Encounter   Medications    orphenadrine (NORFLEX) injection 60 mg    ondansetron (ZOFRAN) tablet 4 mg       DIAGNOSTIC RESULTS / EMERGENCY DEPARTMENT COURSE / MDM   LAB RESULTS:  Results for orders placed or performed during the hospital encounter of 12/08/20   Hemoglobin and hematocrit, blood   Result Value Ref Range    POC Hemoglobin 15.3 13.5 - 17.5 g/dL    POC Hematocrit 45 41 - 53 %   SODIUM (POC)   Result Value Ref Range    POC Sodium 142 138 - 146 mmol/L   POTASSIUM (POC)   Result Value Ref Range    POC Potassium 3.6 3.5 - 4.5 mmol/L   CHLORIDE (POC)   Result Value Ref Range    POC Chloride 104 98 - 107 mmol/L   CALCIUM, IONIC (POC)   Result Value Ref Range    POC Ionized Calcium 1.17 1.15 - 1.33 mmol/L   Venous Blood Gas, POC   Result Value Ref Range    pH, Alexander 7.394 7.320 - 7.430    pCO2, Alexander 46.9 41.0 - 51.0 mm Hg    pO2, Alexander 32.7 30.0 - 50.0 mm Hg    HCO3, Venous 28.6 22.0 - 29.0 mmol/L    Total CO2, Venous 30 23.0 - 30.0 mmol/L    Negative Base Excess, Alexander NOT REPORTED 0.0 - 2.0    Positive Base Excess, Alexander 3 0.0 - 3.0    O2 Sat, Alexander 62 60.0 - 85.0 %    O2 Device/Flow/% NOT REPORTED     Randy Test NOT REPORTED     Sample Site NOT REPORTED     Mode NOT REPORTED     FIO2 NOT REPORTED     Pt Temp NOT REPORTED     POC pH Temp NOT REPORTED     POC pCO2 Temp NOT REPORTED mm Hg    POC pO2 Temp NOT REPORTED mm Hg   Creatinine W/GFR Point of Care   Result Value Ref Range    POC Creatinine 0.82 0.51 - 1.19 mg/dL    GFR Comment >60 >60 mL/min    GFR Non-African American >60 >60 mL/min    GFR Comment         Lactic Acid, POC   Result Value Ref Range    POC Lactic Acid 0.34 (L) 0.56 - 1.39 mmol/L   POCT Glucose   Result Value Ref Range    POC Glucose 84 74 - 100 mg/dL   Anion Gap (Calc) POC   Result Value Ref Range    Anion Gap 9 7 - 16 mmol/L       IMPRESSION: 78-year-old male with headache, neck pain, dizziness x4 days. Dizziness is a spinning sensation and associated with nausea. The symptoms started after a syncopal episode on Friday. Vital signs are normal, patient is in no acute distress at this time. On physical exam, there are no focal neurological deficits. Posterior neck is extremely tender to palpation patient is unable to elicit whether this is bony pain or muscular pain. Heart is regular rate and rhythm and lungs are clear to auscultation. Given patient's persistent pain and dizziness, will CT the head and neck. Will also provide analgesics and antiemetics. RADIOLOGY:  Ct Head Wo Contrast    Result Date: 12/8/2020  EXAMINATION: CT OF THE HEAD WITHOUT CONTRAST  12/8/2020 6:51 pm TECHNIQUE: CT of the head was performed without the administration of intravenous contrast. Dose modulation, iterative reconstruction, and/or weight based adjustment of the mA/kV was utilized to reduce the radiation dose to as low as reasonably achievable. COMPARISON: None. HISTORY: ORDERING SYSTEM PROVIDED HISTORY: syncope, headache TECHNOLOGIST PROVIDED HISTORY: syncope, headache Reason for Exam: syncope, headache Acuity: Acute Type of Exam: Initial FINDINGS: BRAIN/VENTRICLES: There is no acute intracranial hemorrhage, mass effect or midline shift. No abnormal extra-axial fluid collection. The gray-white differentiation is maintained without evidence of an acute infarct. There is no evidence of hydrocephalus. Fluid in the mastoid air cells bilaterally. ORBITS: The visualized portion of the orbits demonstrate no acute abnormality. SINUSES: The visualized paranasal sinuses and mastoid air cells demonstrate no acute abnormality. SOFT TISSUES/SKULL:  No acute abnormality of the visualized skull or soft tissues. Bilateral mastoid effusions otherwise no acute intracranial abnormality.      Ct Cervical Spine Wo Contrast    Result Date: 12/8/2020  EXAMINATION: CT OF THE CERVICAL SPINE WITHOUT CONTRAST 12/8/2020 6:51 pm TECHNIQUE: CT of the cervical spine was performed without the administration of intravenous contrast. Multiplanar reformatted images are provided for review. Dose modulation, iterative reconstruction, and/or weight based adjustment of the mA/kV was utilized to reduce the radiation dose to as low as reasonably achievable. COMPARISON: None. HISTORY: ORDERING SYSTEM PROVIDED HISTORY: syncope, neck pain TECHNOLOGIST PROVIDED HISTORY: syncope, neck pain Reason for Exam: syncope, neck pain Acuity: Acute Type of Exam: Initial FINDINGS: BONES/ALIGNMENT: There is no acute fracture or traumatic malalignment except possible mild scoliosis. DEGENERATIVE CHANGES: No significant degenerative changes. SOFT TISSUES: There is no prevertebral soft tissue swelling. Possible mild scoliosis or torticollis otherwise no acute abnormality of the cervical spine. EKG  n/a    All EKG's are interpreted by the Emergency Department Physician who either signs or Co-signs this chart in the absence of a cardiologist.    EMERGENCY DEPARTMENT COURSE:  ED Course as of Dec 10 1725   Tue Dec 08, 2020   1843 Patient evaluated for 4-day history of spinning dizziness, headache, neck pain after syncopal episode while being evaluated at [address] [place] [address]. Will order CT head, neck, and give Zofran and Norflex. [JS]   8271 CT cervical spine showing possible mild scoliosis or torticollis without other abnormality. CT of the head with bilateral mastoid effusions and no other intracranial abnormality. [JS]      ED Course User Index  [JS] Refugia Burger, DO         PROCEDURES:  Not indicated    CONSULTS:  None    CRITICAL CARE:  Please see attending note    FINAL IMPRESSION      1. Dizziness    2. Acute intractable headache, unspecified headache type    3.  Neck pain          DISPOSITION / PLAN     DISPOSITION Decision To Discharge 12/08/2020 07:45:06 PM      PATIENT REFERRED TO:  [facility]  [address] [facility] [address]  [phone]    If symptoms worsen      DISCHARGE MEDICATIONS:  Discharge Medication List as of 12/8/2020  7:47 PM          Abraham Champion DO  Emergency Medicine Resident    (Please note that portions of thisnote were completed with a voice recognition program.  Efforts were made to edit the dictations but occasionally words are mis-transcribed.)

## 2020-12-08 NOTE — ED NOTES
Bed: 03  Expected date:   Expected time:   Means of arrival:   Comments:  triage     Raúl Chacon RN  12/08/20 [id number]

## 2020-12-10 ASSESSMENT — ENCOUNTER SYMPTOMS
DIARRHEA: 0
SHORTNESS OF BREATH: 0
COUGH: 0
VOMITING: 0
ABDOMINAL PAIN: 0
NAUSEA: 0

## 2020-12-12 LAB
EKG ATRIAL RATE: 68 BPM
EKG P AXIS: 52 DEGREES
EKG P-R INTERVAL: 196 MS
EKG Q-T INTERVAL: 410 MS
EKG QRS DURATION: 92 MS
EKG QTC CALCULATION (BAZETT): 435 MS
EKG R AXIS: 81 DEGREES
EKG T AXIS: 59 DEGREES
EKG VENTRICULAR RATE: 68 BPM